# Patient Record
Sex: FEMALE | Race: WHITE | NOT HISPANIC OR LATINO | Employment: FULL TIME | RURAL
[De-identification: names, ages, dates, MRNs, and addresses within clinical notes are randomized per-mention and may not be internally consistent; named-entity substitution may affect disease eponyms.]

---

## 2019-10-28 ENCOUNTER — OFFICE VISIT (OUTPATIENT)
Dept: FAMILY MEDICINE CLINIC | Facility: CLINIC | Age: 34
End: 2019-10-28

## 2019-10-28 VITALS
TEMPERATURE: 98 F | DIASTOLIC BLOOD PRESSURE: 76 MMHG | WEIGHT: 166.2 LBS | RESPIRATION RATE: 18 BRPM | HEIGHT: 68 IN | HEART RATE: 62 BPM | OXYGEN SATURATION: 99 % | BODY MASS INDEX: 25.19 KG/M2 | SYSTOLIC BLOOD PRESSURE: 113 MMHG

## 2019-10-28 DIAGNOSIS — Z13.29 SCREENING FOR HYPOTHYROIDISM: ICD-10-CM

## 2019-10-28 DIAGNOSIS — K42.0 UMBILICAL HERNIA WITH OBSTRUCTION, WITHOUT GANGRENE: ICD-10-CM

## 2019-10-28 DIAGNOSIS — Z00.00 ANNUAL PHYSICAL EXAM: Primary | ICD-10-CM

## 2019-10-28 DIAGNOSIS — Z13.220 SCREENING FOR HYPERLIPIDEMIA: ICD-10-CM

## 2019-10-28 PROCEDURE — 99395 PREV VISIT EST AGE 18-39: CPT | Performed by: FAMILY MEDICINE

## 2019-10-28 PROCEDURE — 99213 OFFICE O/P EST LOW 20 MIN: CPT | Performed by: FAMILY MEDICINE

## 2019-10-28 RX ORDER — INFLUENZA VIRUS VACCINE 15; 15; 15; 15 UG/.5ML; UG/.5ML; UG/.5ML; UG/.5ML
SUSPENSION INTRAMUSCULAR
Refills: 0 | COMMUNITY
Start: 2019-10-04 | End: 2019-10-28

## 2019-10-28 NOTE — PROGRESS NOTES
"Chief Complaint   Patient presents with   • Annual Exam     doesn't want PAP. (hasn't had one done if 5 years)    • Hernia     umbilical     Subjective   Goldie Weber is a 33 y.o. female here for her annual physical with me. Goldie is here for coordination of medical care, to discuss health maintenance, disease prevention as well as to followup on medical problems. Patient has been followed by me since . Patient's last CPE was 2018. Activity level is moderate. Exercises 2 per week. Appetite is good. Feels well with one complaints. Energy level is good. Sleeps well. Patient is doing routine self skin exam monthly. Patient is doing routine self-breast exams is NOT.  Patient has breast implants. Patient reports her last pap smear was 5 years ago.     Hernia:   Patient reports she thinks she has an umbilical hernia. This has been present for 5 years, ever since having a . She reports it protrudes during activities that she tightens her abdominal muscles. She reports mild pain to the area- like a \"catching\" during her work outs. She has not had this looked at prior to today's appointment.       Allergies:  Allergies no known allergies    Social History:  Social History     Socioeconomic History   • Marital status:      Spouse name: Not on file   • Number of children: Not on file   • Years of education: Not on file   • Highest education level: Not on file   Tobacco Use   • Smoking status: Never Smoker   • Smokeless tobacco: Never Used       Family History:  No family history on file.    Past Medical History :  Active Ambulatory Problems     Diagnosis Date Noted   • Annual physical exam 10/28/2019   • Umbilical hernia with obstruction, without gangrene 10/29/2019     Resolved Ambulatory Problems     Diagnosis Date Noted   • No Resolved Ambulatory Problems     No Additional Past Medical History       Medication List:  No current outpatient medications on file.    Past Surgical History:  No " "past surgical history on file.    Review Of Systems:  Review of Systems   Constitutional: Negative for activity change, appetite change and fatigue.   HENT: Negative for congestion, postnasal drip, sinus pressure and sore throat.    Eyes: Negative for blurred vision and itching.   Respiratory: Negative for cough, shortness of breath and wheezing.    Cardiovascular: Negative for chest pain.   Gastrointestinal: Negative for abdominal pain, constipation, nausea, vomiting and GERD.        Possible umbilical hernia.    Endocrine: Negative for cold intolerance and heat intolerance.   Genitourinary: Negative for difficulty urinating, dysuria and urinary incontinence.   Musculoskeletal: Negative for back pain, joint swelling and neck pain.   Skin: Negative for color change and rash.   Neurological: Negative for dizziness, speech difficulty, weakness and memory problem.   Psychiatric/Behavioral: Negative for behavioral problems, decreased concentration, suicidal ideas and depressed mood.       The following portions of the patient's history were reviewed and updated as appropriate: allergies, current medications, past family history, past medical history, past social history, past surgical history and problem list.      Physical Exam:  Vital Signs:  Visit Vitals  /76   Pulse 62   Temp 98 °F (36.7 °C)   Resp 18   Ht 172.7 cm (68\")   Wt 75.4 kg (166 lb 3.2 oz)   SpO2 99%   BMI 25.27 kg/m²       Physical Exam   Constitutional: She is oriented to person, place, and time. She appears well-developed and well-nourished. She is active.   HENT:   Head: Normocephalic and atraumatic.   Nose: Nose normal.   Eyes: Conjunctivae and lids are normal. Pupils are equal, round, and reactive to light.   Neck: Normal range of motion. Neck supple.   Cardiovascular: Normal rate, regular rhythm, normal heart sounds and normal pulses.   Pulmonary/Chest: Effort normal and breath sounds normal. No respiratory distress.   Abdominal: Soft. Bowel " sounds are normal.   Musculoskeletal: Normal range of motion.   Neurological: She is alert and oriented to person, place, and time.   Skin: Skin is warm and dry. Capillary refill takes less than 2 seconds.   Psychiatric: She has a normal mood and affect. Her behavior is normal. Judgment and thought content normal.   Vitals reviewed.        Assessment and Plan:  Diagnoses and all orders for this visit:    1. Annual physical exam (Primary)  Assessment & Plan:  Discussed injury prevention, diet and exercise, safe sexual practices, and screening for common diseases. Encouraged use of sunscreen and seatbelts. Discussed timing of  cervical cancer screening. Encouraged monthly self-breast exams, yearly clinical breast exams, and discussed timing of mammograms. Avoidance of tobacco encouraged. Limitation or avoidance of alcohol encouraged. Recommend yearly dental and eye exams. Also discussed monitoring of blood pressure, lipids.            2. Umbilical hernia with obstruction, without gangrene  Assessment & Plan:  She was referred to Gen Surg for further evaluation and treatment.      Orders:  -     Ambulatory Referral to General Surgery    3. Screening for hyperlipidemia  -     Lipid Panel With / Chol / HDL Ratio  -     Comprehensive Metabolic Panel  -     CBC Auto Differential    4. Screening for hypothyroidism  -     TSH    Other orders  -     CBC & Differential

## 2019-10-29 PROBLEM — K42.0 UMBILICAL HERNIA WITH OBSTRUCTION, WITHOUT GANGRENE: Status: ACTIVE | Noted: 2019-10-29

## 2019-10-29 LAB
ALBUMIN SERPL-MCNC: 4.8 G/DL (ref 3.5–5.5)
ALBUMIN/GLOB SERPL: 1.8 {RATIO} (ref 1.2–2.2)
ALP SERPL-CCNC: 54 IU/L (ref 39–117)
ALT SERPL-CCNC: 11 IU/L (ref 0–32)
AST SERPL-CCNC: 18 IU/L (ref 0–40)
BASOPHILS # BLD AUTO: 0 X10E3/UL (ref 0–0.2)
BASOPHILS NFR BLD AUTO: 0 %
BILIRUB SERPL-MCNC: 0.5 MG/DL (ref 0–1.2)
BUN SERPL-MCNC: 16 MG/DL (ref 6–20)
BUN/CREAT SERPL: 18 (ref 9–23)
CALCIUM SERPL-MCNC: 9.6 MG/DL (ref 8.7–10.2)
CHLORIDE SERPL-SCNC: 102 MMOL/L (ref 96–106)
CHOLEST SERPL-MCNC: 232 MG/DL (ref 100–199)
CHOLEST/HDLC SERPL: 5.3 RATIO (ref 0–4.4)
CO2 SERPL-SCNC: 23 MMOL/L (ref 20–29)
CREAT SERPL-MCNC: 0.89 MG/DL (ref 0.57–1)
EOSINOPHIL # BLD AUTO: 0.2 X10E3/UL (ref 0–0.4)
EOSINOPHIL NFR BLD AUTO: 3 %
ERYTHROCYTE [DISTWIDTH] IN BLOOD BY AUTOMATED COUNT: 14 % (ref 12.3–15.4)
GLOBULIN SER CALC-MCNC: 2.6 G/DL (ref 1.5–4.5)
GLUCOSE SERPL-MCNC: 90 MG/DL (ref 65–99)
HCT VFR BLD AUTO: 42.7 % (ref 34–46.6)
HDLC SERPL-MCNC: 44 MG/DL
HGB BLD-MCNC: 13.9 G/DL (ref 11.1–15.9)
IMM GRANULOCYTES # BLD AUTO: 0 X10E3/UL (ref 0–0.1)
IMM GRANULOCYTES NFR BLD AUTO: 0 %
LDLC SERPL CALC-MCNC: 149 MG/DL (ref 0–99)
LYMPHOCYTES # BLD AUTO: 2.6 X10E3/UL (ref 0.7–3.1)
LYMPHOCYTES NFR BLD AUTO: 37 %
MCH RBC QN AUTO: 28.7 PG (ref 26.6–33)
MCHC RBC AUTO-ENTMCNC: 32.6 G/DL (ref 31.5–35.7)
MCV RBC AUTO: 88 FL (ref 79–97)
MONOCYTES # BLD AUTO: 0.4 X10E3/UL (ref 0.1–0.9)
MONOCYTES NFR BLD AUTO: 5 %
NEUTROPHILS # BLD AUTO: 3.8 X10E3/UL (ref 1.4–7)
NEUTROPHILS NFR BLD AUTO: 55 %
PLATELET # BLD AUTO: 299 X10E3/UL (ref 150–450)
POTASSIUM SERPL-SCNC: 4.2 MMOL/L (ref 3.5–5.2)
PROT SERPL-MCNC: 7.4 G/DL (ref 6–8.5)
RBC # BLD AUTO: 4.85 X10E6/UL (ref 3.77–5.28)
SODIUM SERPL-SCNC: 140 MMOL/L (ref 134–144)
TRIGL SERPL-MCNC: 193 MG/DL (ref 0–149)
TSH SERPL DL<=0.005 MIU/L-ACNC: 2.36 UIU/ML (ref 0.45–4.5)
VLDLC SERPL CALC-MCNC: 39 MG/DL (ref 5–40)
WBC # BLD AUTO: 7 X10E3/UL (ref 3.4–10.8)

## 2019-10-29 NOTE — ASSESSMENT & PLAN NOTE
Discussed injury prevention, diet and exercise, safe sexual practices, and screening for common diseases. Encouraged use of sunscreen and seatbelts. Discussed timing of  cervical cancer screening. Encouraged monthly self-breast exams, yearly clinical breast exams, and discussed timing of mammograms. Avoidance of tobacco encouraged. Limitation or avoidance of alcohol encouraged. Recommend yearly dental and eye exams. Also discussed monitoring of blood pressure, lipids.

## 2019-10-31 ENCOUNTER — OFFICE VISIT (OUTPATIENT)
Dept: SURGERY | Facility: CLINIC | Age: 34
End: 2019-10-31

## 2019-10-31 VITALS
BODY MASS INDEX: 26.71 KG/M2 | WEIGHT: 166.2 LBS | SYSTOLIC BLOOD PRESSURE: 120 MMHG | DIASTOLIC BLOOD PRESSURE: 83 MMHG | HEART RATE: 77 BPM | OXYGEN SATURATION: 99 % | TEMPERATURE: 98 F | HEIGHT: 66 IN

## 2019-10-31 DIAGNOSIS — K42.0 UMBILICAL HERNIA WITH OBSTRUCTION, WITHOUT GANGRENE: Primary | ICD-10-CM

## 2019-10-31 PROCEDURE — 99203 OFFICE O/P NEW LOW 30 MIN: CPT | Performed by: SURGERY

## 2019-10-31 NOTE — PROGRESS NOTES
"Subjective   Goldie Weber is a 33 y.o. female.   Chief Complaint   Patient presents with   • Hernia     umbilical     /83   Pulse 77   Temp 98 °F (36.7 °C) (Oral)   Ht 167.6 cm (66\")   Wt 75.4 kg (166 lb 3.2 oz)   SpO2 99%   BMI 26.83 kg/m²     HISTORY OF PRESENT ILLNESS:  33-year-old lady who is been referred to me for evaluation of an umbilical hernia.  She says it has likely been present for about 2 years.  She has had recent weight loss and has been doing strenuous activity including Olympic style lifting and she is noticed a small bulge in the lateral aspect of her bellybutton.  She says that she has some mild discomfort here it feels like catching and she has to push on it.  When she pushes it back in she has discomfort relief.  She says that this has to happen nearly daily.  She discussed it with her  who suggested that it get evaluated.  She denies any obstructive symptoms like nausea vomiting constipation or obstipation.      No outpatient encounter medications on file as of 10/31/2019.     No facility-administered encounter medications on file as of 10/31/2019.          The following portions of the patient's history were reviewed and updated as appropriate: allergies, current medications, past family history, past medical history, past social history, past surgical history and problem list.    Review of Systems  A complete review of system has been obtained is positive for loss of sleep abdominal pain irregular heartbeat and varicose veins tooth pain in the remainder of the review of systems is negative  Objective   Physical Exam   Constitutional: She appears well-developed and well-nourished.   HENT:   Head: Normocephalic and atraumatic.   Eyes: Conjunctivae are normal. No scleral icterus.   Neck: No tracheal deviation present.   Cardiovascular: Normal rate and intact distal pulses.   Pulmonary/Chest: Effort normal. No respiratory distress.   Abdominal: Soft.   Abdomen is soft " and flat no significant abdominal tenderness to palpation in the right superior lateral aspect there is a hernia sac that is likely about 1 cm and with reduction it feels like the hernia defect itself is likely more like 2 to 3 mm.  Minor discomfort during palpation no overlying skin changes like erythema   Musculoskeletal: She exhibits no edema or deformity.   Lymphadenopathy:     She has no cervical adenopathy.   Neurological: She is alert. No cranial nerve deficit.   Skin: Skin is warm and dry.   Psychiatric: She has a normal mood and affect. Her behavior is normal.         Assessment/Plan   Goldie was seen today for hernia.    Diagnoses and all orders for this visit:    Umbilical hernia with obstruction, without gangrene    Counseled her about the natural history of umbilical hernias and that watchful waiting would be an appropriate strategy but as this becomes more symptomatic it starts to make more sense to undergo repair.  I counseled her about the risks of the operation and the surgical approach which would be an open repair likely with sutures alone since this appears to be a less than 1 cm hernia defect.  She had some concern about mesh placement if it was a larger hernia repair.  We talked about the risks and benefits of mesh placement in the event that the hernia was larger than I can feel on exam.  After discussion she does want to move forward with the hernia repair.  I have advised her to not lift any more than 15 pounds for 4 weeks after surgery but that she should be able to get back to more cardio style within 1 to 2 weeks after surgery I have asked her not to do any dead lifts or squats for about 6 weeks following the repair to reduce the risk of recurrence.    Mendez Cavazos MD  10/31/2019  4:16 PM

## 2019-11-01 RX ORDER — SODIUM CHLORIDE 9 MG/ML
100 INJECTION, SOLUTION INTRAVENOUS CONTINUOUS
Status: CANCELLED | OUTPATIENT
Start: 2019-11-01

## 2019-11-06 ENCOUNTER — APPOINTMENT (OUTPATIENT)
Dept: PREADMISSION TESTING | Facility: HOSPITAL | Age: 34
End: 2019-11-06

## 2019-11-06 VITALS
OXYGEN SATURATION: 99 % | SYSTOLIC BLOOD PRESSURE: 115 MMHG | BODY MASS INDEX: 26.55 KG/M2 | HEART RATE: 71 BPM | WEIGHT: 164.5 LBS | DIASTOLIC BLOOD PRESSURE: 79 MMHG

## 2019-11-08 ENCOUNTER — ANESTHESIA EVENT (OUTPATIENT)
Dept: PERIOP | Facility: HOSPITAL | Age: 34
End: 2019-11-08

## 2019-11-11 ENCOUNTER — HOSPITAL ENCOUNTER (OUTPATIENT)
Facility: HOSPITAL | Age: 34
Setting detail: HOSPITAL OUTPATIENT SURGERY
Discharge: HOME | End: 2019-11-11
Attending: SURGERY | Admitting: SURGERY

## 2019-11-11 ENCOUNTER — ANESTHESIA (OUTPATIENT)
Dept: PERIOP | Facility: HOSPITAL | Age: 34
End: 2019-11-11

## 2019-11-11 VITALS
TEMPERATURE: 98.6 F | DIASTOLIC BLOOD PRESSURE: 74 MMHG | HEART RATE: 62 BPM | SYSTOLIC BLOOD PRESSURE: 110 MMHG | OXYGEN SATURATION: 99 % | RESPIRATION RATE: 16 BRPM

## 2019-11-11 DIAGNOSIS — K42.0 UMBILICAL HERNIA WITH OBSTRUCTION, WITHOUT GANGRENE: ICD-10-CM

## 2019-11-11 PROBLEM — K42.9 UMBILICAL HERNIA WITHOUT OBSTRUCTION AND WITHOUT GANGRENE: Status: ACTIVE | Noted: 2019-10-29

## 2019-11-11 LAB — B-HCG UR QL: NEGATIVE

## 2019-11-11 PROCEDURE — 25010000002 DEXAMETHASONE PER 1 MG: Performed by: NURSE ANESTHETIST, CERTIFIED REGISTERED

## 2019-11-11 PROCEDURE — 25010000002 ONDANSETRON PER 1 MG: Performed by: ANESTHESIOLOGY

## 2019-11-11 PROCEDURE — 49585 PR REPAIR UMBILICAL HERN,5+Y/O,REDUC: CPT | Performed by: NURSE PRACTITIONER

## 2019-11-11 PROCEDURE — 49585 PR REPAIR UMBILICAL HERN,5+Y/O,REDUC: CPT | Performed by: SURGERY

## 2019-11-11 PROCEDURE — 81025 URINE PREGNANCY TEST: CPT | Performed by: SURGERY

## 2019-11-11 PROCEDURE — 25010000002 KETOROLAC TROMETHAMINE PER 15 MG: Performed by: NURSE ANESTHETIST, CERTIFIED REGISTERED

## 2019-11-11 PROCEDURE — 25010000002 PROPOFOL 10 MG/ML EMULSION: Performed by: NURSE ANESTHETIST, CERTIFIED REGISTERED

## 2019-11-11 PROCEDURE — 25010000002 ONDANSETRON PER 1 MG: Performed by: NURSE ANESTHETIST, CERTIFIED REGISTERED

## 2019-11-11 PROCEDURE — 25010000002 FENTANYL CITRATE (PF) 100 MCG/2ML SOLUTION: Performed by: NURSE ANESTHETIST, CERTIFIED REGISTERED

## 2019-11-11 RX ORDER — PROMETHAZINE HYDROCHLORIDE 25 MG/1
25 TABLET ORAL ONCE AS NEEDED
Status: DISCONTINUED | OUTPATIENT
Start: 2019-11-11 | End: 2019-11-11 | Stop reason: HOSPADM

## 2019-11-11 RX ORDER — FENTANYL CITRATE 50 UG/ML
INJECTION, SOLUTION INTRAMUSCULAR; INTRAVENOUS AS NEEDED
Status: DISCONTINUED | OUTPATIENT
Start: 2019-11-11 | End: 2019-11-11 | Stop reason: SURG

## 2019-11-11 RX ORDER — MEPERIDINE HYDROCHLORIDE 25 MG/ML
12.5 INJECTION INTRAMUSCULAR; INTRAVENOUS; SUBCUTANEOUS
Status: DISCONTINUED | OUTPATIENT
Start: 2019-11-11 | End: 2019-11-11 | Stop reason: HOSPADM

## 2019-11-11 RX ORDER — SODIUM CHLORIDE 9 MG/ML
9 INJECTION, SOLUTION INTRAVENOUS CONTINUOUS PRN
Status: DISCONTINUED | OUTPATIENT
Start: 2019-11-11 | End: 2019-11-11 | Stop reason: HOSPADM

## 2019-11-11 RX ORDER — KETOROLAC TROMETHAMINE 30 MG/ML
INJECTION, SOLUTION INTRAMUSCULAR; INTRAVENOUS AS NEEDED
Status: DISCONTINUED | OUTPATIENT
Start: 2019-11-11 | End: 2019-11-11 | Stop reason: SURG

## 2019-11-11 RX ORDER — SODIUM CHLORIDE 0.9 % (FLUSH) 0.9 %
3 SYRINGE (ML) INJECTION EVERY 12 HOURS SCHEDULED
Status: DISCONTINUED | OUTPATIENT
Start: 2019-11-11 | End: 2019-11-11 | Stop reason: HOSPADM

## 2019-11-11 RX ORDER — LIDOCAINE HYDROCHLORIDE 20 MG/ML
INJECTION, SOLUTION EPIDURAL; INFILTRATION; INTRACAUDAL; PERINEURAL AS NEEDED
Status: DISCONTINUED | OUTPATIENT
Start: 2019-11-11 | End: 2019-11-11 | Stop reason: SURG

## 2019-11-11 RX ORDER — LIDOCAINE HYDROCHLORIDE AND EPINEPHRINE 10; 10 MG/ML; UG/ML
INJECTION, SOLUTION INFILTRATION; PERINEURAL AS NEEDED
Status: DISCONTINUED | OUTPATIENT
Start: 2019-11-11 | End: 2019-11-11 | Stop reason: HOSPADM

## 2019-11-11 RX ORDER — HYDROCODONE BITARTRATE AND ACETAMINOPHEN 5; 325 MG/1; MG/1
1 TABLET ORAL ONCE AS NEEDED
Status: DISCONTINUED | OUTPATIENT
Start: 2019-11-11 | End: 2019-11-11 | Stop reason: HOSPADM

## 2019-11-11 RX ORDER — SODIUM CHLORIDE 9 MG/ML
100 INJECTION, SOLUTION INTRAVENOUS CONTINUOUS
Status: DISCONTINUED | OUTPATIENT
Start: 2019-11-11 | End: 2019-11-11 | Stop reason: HOSPADM

## 2019-11-11 RX ORDER — PROMETHAZINE HYDROCHLORIDE 25 MG/1
25 SUPPOSITORY RECTAL ONCE AS NEEDED
Status: DISCONTINUED | OUTPATIENT
Start: 2019-11-11 | End: 2019-11-11 | Stop reason: HOSPADM

## 2019-11-11 RX ORDER — DIPHENHYDRAMINE HYDROCHLORIDE 50 MG/ML
12.5 INJECTION INTRAMUSCULAR; INTRAVENOUS
Status: DISCONTINUED | OUTPATIENT
Start: 2019-11-11 | End: 2019-11-11 | Stop reason: HOSPADM

## 2019-11-11 RX ORDER — ONDANSETRON 2 MG/ML
4 INJECTION INTRAMUSCULAR; INTRAVENOUS ONCE AS NEEDED
Status: DISCONTINUED | OUTPATIENT
Start: 2019-11-11 | End: 2019-11-11 | Stop reason: HOSPADM

## 2019-11-11 RX ORDER — ONDANSETRON 2 MG/ML
INJECTION INTRAMUSCULAR; INTRAVENOUS AS NEEDED
Status: DISCONTINUED | OUTPATIENT
Start: 2019-11-11 | End: 2019-11-11 | Stop reason: SURG

## 2019-11-11 RX ORDER — DEXAMETHASONE SODIUM PHOSPHATE 4 MG/ML
INJECTION, SOLUTION INTRA-ARTICULAR; INTRALESIONAL; INTRAMUSCULAR; INTRAVENOUS; SOFT TISSUE AS NEEDED
Status: DISCONTINUED | OUTPATIENT
Start: 2019-11-11 | End: 2019-11-11 | Stop reason: SURG

## 2019-11-11 RX ORDER — PROPOFOL 10 MG/ML
VIAL (ML) INTRAVENOUS AS NEEDED
Status: DISCONTINUED | OUTPATIENT
Start: 2019-11-11 | End: 2019-11-11 | Stop reason: SURG

## 2019-11-11 RX ORDER — PROMETHAZINE HYDROCHLORIDE 25 MG/ML
6.25 INJECTION, SOLUTION INTRAMUSCULAR; INTRAVENOUS ONCE AS NEEDED
Status: DISCONTINUED | OUTPATIENT
Start: 2019-11-11 | End: 2019-11-11 | Stop reason: HOSPADM

## 2019-11-11 RX ORDER — SCOLOPAMINE TRANSDERMAL SYSTEM 1 MG/1
1 PATCH, EXTENDED RELEASE TRANSDERMAL
Status: DISCONTINUED | OUTPATIENT
Start: 2019-11-11 | End: 2019-11-11 | Stop reason: HOSPADM

## 2019-11-11 RX ORDER — SODIUM CHLORIDE 0.9 % (FLUSH) 0.9 %
3-10 SYRINGE (ML) INJECTION AS NEEDED
Status: DISCONTINUED | OUTPATIENT
Start: 2019-11-11 | End: 2019-11-11 | Stop reason: HOSPADM

## 2019-11-11 RX ORDER — FENTANYL CITRATE 50 UG/ML
50 INJECTION, SOLUTION INTRAMUSCULAR; INTRAVENOUS
Status: DISCONTINUED | OUTPATIENT
Start: 2019-11-11 | End: 2019-11-11 | Stop reason: HOSPADM

## 2019-11-11 RX ORDER — ONDANSETRON 2 MG/ML
4 INJECTION INTRAMUSCULAR; INTRAVENOUS ONCE
Status: COMPLETED | OUTPATIENT
Start: 2019-11-11 | End: 2019-11-11

## 2019-11-11 RX ORDER — HYDROCODONE BITARTRATE AND ACETAMINOPHEN 5; 325 MG/1; MG/1
1 TABLET ORAL EVERY 4 HOURS PRN
Qty: 10 TABLET | Refills: 0 | Status: SHIPPED | OUTPATIENT
Start: 2019-11-11 | End: 2021-12-15 | Stop reason: HOSPADM

## 2019-11-11 RX ADMIN — CEFAZOLIN SODIUM 2 G: 1 INJECTION, POWDER, FOR SOLUTION INTRAMUSCULAR; INTRAVENOUS at 10:51

## 2019-11-11 RX ADMIN — FENTANYL CITRATE 100 MCG: 50 INJECTION, SOLUTION INTRAMUSCULAR; INTRAVENOUS at 10:46

## 2019-11-11 RX ADMIN — FENTANYL CITRATE 50 MCG: 50 INJECTION, SOLUTION INTRAMUSCULAR; INTRAVENOUS at 11:01

## 2019-11-11 RX ADMIN — ONDANSETRON 4 MG: 2 INJECTION INTRAMUSCULAR; INTRAVENOUS at 11:00

## 2019-11-11 RX ADMIN — SCOPALAMINE 1 PATCH: 1 PATCH, EXTENDED RELEASE TRANSDERMAL at 10:30

## 2019-11-11 RX ADMIN — SODIUM CHLORIDE 100 ML/HR: 900 INJECTION, SOLUTION INTRAVENOUS at 10:30

## 2019-11-11 RX ADMIN — ONDANSETRON 4 MG: 2 INJECTION INTRAMUSCULAR; INTRAVENOUS at 10:30

## 2019-11-11 RX ADMIN — DEXAMETHASONE SODIUM PHOSPHATE 4 MG: 4 INJECTION, SOLUTION INTRAMUSCULAR; INTRAVENOUS at 10:51

## 2019-11-11 RX ADMIN — LIDOCAINE HYDROCHLORIDE 100 MG: 20 INJECTION, SOLUTION EPIDURAL; INFILTRATION; INTRACAUDAL; PERINEURAL at 10:46

## 2019-11-11 RX ADMIN — KETOROLAC TROMETHAMINE 30 MG: 30 INJECTION, SOLUTION INTRAMUSCULAR at 11:00

## 2019-11-11 RX ADMIN — PROPOFOL 200 MG: 10 INJECTION, EMULSION INTRAVENOUS at 10:46

## 2019-11-11 RX ADMIN — SODIUM CHLORIDE: 0.9 INJECTION, SOLUTION INTRAVENOUS at 10:51

## 2019-11-11 NOTE — ANESTHESIA PROCEDURE NOTES
Airway  Urgency: elective    Date/Time: 11/11/2019 10:49 AM  Difficult airway    General Information and Staff    Patient location during procedure: OR    Indications and Patient Condition  Indications for airway management: airway protection    Preoxygenated: yes  MILS maintained throughout  Mask difficulty assessment: 0 - not attempted    Final Airway Details  Final airway type: supraglottic airway      Successful airway: unique  Size 4    Number of attempts at approach: 1  Assessment: lips, teeth, and gum same as pre-op

## 2019-11-11 NOTE — OP NOTE
Operative Report:    Patient Name:  Goldie Weber  YOB: 1985    Date of Surgery:  11/11/2019     Indications: 34-year-old lady who presents to the office to discuss a symptomatic umbilical hernia that has been present for about 2 years.  I counseled her about the diagnosis and the treatment options including watchful waiting versus moving forward with surgical repair as well as the expected recovery.  After our discussion she wished to have her hernia surgically fixed.  I counseled her about the risk of mesh placement and the indications for mesh and that if this was a 2 to 3 mm hernia I likely would perform a suture repair alone.  She understood was willing to proceed.    Pre-op Diagnosis:   Umbilical hernia without obstruction, without gangrene     Post-Op Diagnosis Codes:     * Umbilical hernia without obstruction, without gangrene    Procedure/CPT® Codes:      Procedure(s):  UMBILICAL HERNIA REPAIR    Staff:  Surgeon(s):  Mendez Cavazos MD    Assistant: Leyla Phillip APRN    Anesthesia: General    Estimated Blood Loss: minimal    Implants:    Nothing was implanted during the procedure    Specimen:          None        Findings: Approximately 3 mm umbilical hernia defect containing preperitoneal fat    Complications: None    Description of Procedure: Patient was taken to the operating room placed on the operating table in the supine position under general anesthesia.  Her abdomen was prepped and draped in normal sterile fashion.  Timeout was performed identifying the correct patient procedure site of operation.  I began the operation by making a curvilinear infra umbilical skin incision with a 15 blade scalpel.  Dissection down to the abdominal fascia and around the umbilical stalk was performed with the Bovie.  The umbilical stalk was then dissected free off of the fascia which revealed an approximately 3 mm hernia defect that had preperitoneal fat bulging through this defect.  The  fascial edges were cleared and the hernia defect was closed with interrupted 0 Ethibond sutures x3.  The umbilical stalk was then tacked to the fascia using a 3-0 Vicryl suture.  The dermis was closed with 3-0 Vicryl suture skin closed with 4-0 Vicryl suture with skin affix placed on the wound.      Mendez Cavazos MD     Date: 11/11/2019  Time: 11:17 AM

## 2021-12-15 ENCOUNTER — HOSPITAL ENCOUNTER (OUTPATIENT)
Facility: HOSPITAL | Age: 36
Setting detail: OBSERVATION
Discharge: HOME | End: 2021-12-15
Attending: EMERGENCY MEDICINE | Admitting: EMERGENCY MEDICINE

## 2021-12-15 ENCOUNTER — APPOINTMENT (OUTPATIENT)
Dept: CT IMAGING | Facility: HOSPITAL | Age: 36
End: 2021-12-15

## 2021-12-15 ENCOUNTER — APPOINTMENT (OUTPATIENT)
Dept: NUCLEAR MEDICINE | Facility: HOSPITAL | Age: 36
End: 2021-12-15

## 2021-12-15 ENCOUNTER — APPOINTMENT (OUTPATIENT)
Dept: GENERAL RADIOLOGY | Facility: HOSPITAL | Age: 36
End: 2021-12-15

## 2021-12-15 VITALS
TEMPERATURE: 98.2 F | OXYGEN SATURATION: 97 % | HEART RATE: 75 BPM | RESPIRATION RATE: 16 BRPM | WEIGHT: 171.3 LBS | SYSTOLIC BLOOD PRESSURE: 108 MMHG | BODY MASS INDEX: 26.89 KG/M2 | DIASTOLIC BLOOD PRESSURE: 74 MMHG | HEIGHT: 67 IN

## 2021-12-15 DIAGNOSIS — R07.9 CHEST PAIN, UNSPECIFIED TYPE: Primary | ICD-10-CM

## 2021-12-15 LAB
ALBUMIN SERPL-MCNC: 4 G/DL (ref 3.5–5.2)
ALBUMIN SERPL-MCNC: 4.3 G/DL (ref 3.5–5.2)
ALBUMIN/GLOB SERPL: 1.2 G/DL
ALBUMIN/GLOB SERPL: 1.3 G/DL
ALP SERPL-CCNC: 54 U/L (ref 39–117)
ALP SERPL-CCNC: 56 U/L (ref 39–117)
ALT SERPL W P-5'-P-CCNC: 10 U/L (ref 1–33)
ALT SERPL W P-5'-P-CCNC: 8 U/L (ref 1–33)
ANION GAP SERPL CALCULATED.3IONS-SCNC: 10 MMOL/L (ref 5–15)
ANION GAP SERPL CALCULATED.3IONS-SCNC: 13 MMOL/L (ref 5–15)
APTT PPP: 26.2 SECONDS (ref 24–31)
AST SERPL-CCNC: 13 U/L (ref 1–32)
AST SERPL-CCNC: 15 U/L (ref 1–32)
BASOPHILS # BLD AUTO: 0.1 10*3/MM3 (ref 0–0.2)
BASOPHILS NFR BLD AUTO: 0.8 % (ref 0–1.5)
BH CV REST NUCLEAR ISOTOPE DOSE: 7.9 MCI
BH CV STRESS BP STAGE 1: NORMAL
BH CV STRESS BP STAGE 2: NORMAL
BH CV STRESS DURATION MIN STAGE 1: 3
BH CV STRESS DURATION MIN STAGE 2: 3
BH CV STRESS DURATION MIN STAGE 3: 0
BH CV STRESS DURATION SEC STAGE 1: 0
BH CV STRESS DURATION SEC STAGE 2: 0
BH CV STRESS DURATION SEC STAGE 3: 46
BH CV STRESS GRADE STAGE 1: 10
BH CV STRESS GRADE STAGE 2: 12
BH CV STRESS GRADE STAGE 3: 14
BH CV STRESS HR STAGE 1: 103
BH CV STRESS HR STAGE 2: 121
BH CV STRESS HR STAGE 3: 130
BH CV STRESS METS STAGE 1: 5
BH CV STRESS METS STAGE 2: 7.5
BH CV STRESS METS STAGE 3: 10
BH CV STRESS NUCLEAR ISOTOPE DOSE: 21.9 MCI
BH CV STRESS PROTOCOL 1: NORMAL
BH CV STRESS RECOVERY BP: NORMAL MMHG
BH CV STRESS RECOVERY HR: 84 BPM
BH CV STRESS SPEED STAGE 1: 1.7
BH CV STRESS SPEED STAGE 2: 2.5
BH CV STRESS SPEED STAGE 3: 3.4
BH CV STRESS STAGE 1: 1
BH CV STRESS STAGE 2: 2
BH CV STRESS STAGE 3: 3
BILIRUB SERPL-MCNC: 0.4 MG/DL (ref 0–1.2)
BILIRUB SERPL-MCNC: 0.4 MG/DL (ref 0–1.2)
BUN SERPL-MCNC: 12 MG/DL (ref 6–20)
BUN SERPL-MCNC: 12 MG/DL (ref 6–20)
BUN/CREAT SERPL: 13.5 (ref 7–25)
BUN/CREAT SERPL: 14.8 (ref 7–25)
CALCIUM SPEC-SCNC: 8.8 MG/DL (ref 8.6–10.5)
CALCIUM SPEC-SCNC: 9.1 MG/DL (ref 8.6–10.5)
CHLORIDE SERPL-SCNC: 103 MMOL/L (ref 98–107)
CHLORIDE SERPL-SCNC: 104 MMOL/L (ref 98–107)
CO2 SERPL-SCNC: 21 MMOL/L (ref 22–29)
CO2 SERPL-SCNC: 23 MMOL/L (ref 22–29)
CREAT SERPL-MCNC: 0.81 MG/DL (ref 0.57–1)
CREAT SERPL-MCNC: 0.89 MG/DL (ref 0.57–1)
D DIMER PPP FEU-MCNC: 1.63 MG/L (FEU) (ref 0–0.59)
DEPRECATED RDW RBC AUTO: 38.9 FL (ref 37–54)
EOSINOPHIL # BLD AUTO: 0.2 10*3/MM3 (ref 0–0.4)
EOSINOPHIL NFR BLD AUTO: 2.4 % (ref 0.3–6.2)
ERYTHROCYTE [DISTWIDTH] IN BLOOD BY AUTOMATED COUNT: 13.2 % (ref 12.3–15.4)
GFR SERPL CREATININE-BSD FRML MDRD: 71 ML/MIN/1.73
GFR SERPL CREATININE-BSD FRML MDRD: 80 ML/MIN/1.73
GLOBULIN UR ELPH-MCNC: 3.3 GM/DL
GLOBULIN UR ELPH-MCNC: 3.4 GM/DL
GLUCOSE SERPL-MCNC: 107 MG/DL (ref 65–99)
GLUCOSE SERPL-MCNC: 97 MG/DL (ref 65–99)
HCG SERPL QL: NEGATIVE
HCT VFR BLD AUTO: 41.4 % (ref 34–46.6)
HGB BLD-MCNC: 14.1 G/DL (ref 12–15.9)
INR PPP: 1.03 (ref 0.93–1.1)
LV EF NUC BP: 58 %
LYMPHOCYTES # BLD AUTO: 3.5 10*3/MM3 (ref 0.7–3.1)
LYMPHOCYTES NFR BLD AUTO: 35.3 % (ref 19.6–45.3)
MAGNESIUM SERPL-MCNC: 2 MG/DL (ref 1.6–2.6)
MAXIMAL PREDICTED HEART RATE: 183 BPM
MCH RBC QN AUTO: 28.8 PG (ref 26.6–33)
MCHC RBC AUTO-ENTMCNC: 34 G/DL (ref 31.5–35.7)
MCV RBC AUTO: 84.9 FL (ref 79–97)
MONOCYTES # BLD AUTO: 0.7 10*3/MM3 (ref 0.1–0.9)
MONOCYTES NFR BLD AUTO: 6.9 % (ref 5–12)
NEUTROPHILS NFR BLD AUTO: 5.5 10*3/MM3 (ref 1.7–7)
NEUTROPHILS NFR BLD AUTO: 54.6 % (ref 42.7–76)
NRBC BLD AUTO-RTO: 0 /100 WBC (ref 0–0.2)
PERCENT MAX PREDICTED HR: 71.04 %
PLATELET # BLD AUTO: 338 10*3/MM3 (ref 140–450)
PMV BLD AUTO: 7.5 FL (ref 6–12)
POTASSIUM SERPL-SCNC: 4 MMOL/L (ref 3.5–5.2)
POTASSIUM SERPL-SCNC: 4.4 MMOL/L (ref 3.5–5.2)
PROT SERPL-MCNC: 7.4 G/DL (ref 6–8.5)
PROT SERPL-MCNC: 7.6 G/DL (ref 6–8.5)
PROTHROMBIN TIME: 11.4 SECONDS (ref 9.6–11.7)
RBC # BLD AUTO: 4.87 10*6/MM3 (ref 3.77–5.28)
SARS-COV-2 RNA PNL SPEC NAA+PROBE: NOT DETECTED
SODIUM SERPL-SCNC: 137 MMOL/L (ref 136–145)
SODIUM SERPL-SCNC: 137 MMOL/L (ref 136–145)
STRESS BASELINE BP: NORMAL MMHG
STRESS BASELINE HR: 90 BPM
STRESS PERCENT HR: 84 %
STRESS POST EXERCISE DUR MIN: 6 MIN
STRESS POST EXERCISE DUR SEC: 46 SEC
STRESS POST PEAK BP: NORMAL MMHG
STRESS POST PEAK HR: 130 BPM
STRESS TARGET HR: 156 BPM
TROPONIN T SERPL-MCNC: <0.01 NG/ML (ref 0–0.03)
TROPONIN T SERPL-MCNC: <0.01 NG/ML (ref 0–0.03)
WBC NRBC COR # BLD: 10 10*3/MM3 (ref 3.4–10.8)
WHOLE BLOOD HOLD SPECIMEN: NORMAL

## 2021-12-15 PROCEDURE — 0 IOPAMIDOL PER 1 ML: Performed by: EMERGENCY MEDICINE

## 2021-12-15 PROCEDURE — 96374 THER/PROPH/DIAG INJ IV PUSH: CPT

## 2021-12-15 PROCEDURE — 93016 CV STRESS TEST SUPVJ ONLY: CPT | Performed by: INTERNAL MEDICINE

## 2021-12-15 PROCEDURE — 84703 CHORIONIC GONADOTROPIN ASSAY: CPT | Performed by: EMERGENCY MEDICINE

## 2021-12-15 PROCEDURE — G0378 HOSPITAL OBSERVATION PER HR: HCPCS

## 2021-12-15 PROCEDURE — 93017 CV STRESS TEST TRACING ONLY: CPT

## 2021-12-15 PROCEDURE — 85025 COMPLETE CBC W/AUTO DIFF WBC: CPT | Performed by: EMERGENCY MEDICINE

## 2021-12-15 PROCEDURE — 87635 SARS-COV-2 COVID-19 AMP PRB: CPT | Performed by: EMERGENCY MEDICINE

## 2021-12-15 PROCEDURE — 93005 ELECTROCARDIOGRAM TRACING: CPT

## 2021-12-15 PROCEDURE — 96375 TX/PRO/DX INJ NEW DRUG ADDON: CPT

## 2021-12-15 PROCEDURE — 93018 CV STRESS TEST I&R ONLY: CPT | Performed by: INTERNAL MEDICINE

## 2021-12-15 PROCEDURE — A9502 TC99M TETROFOSMIN: HCPCS | Performed by: EMERGENCY MEDICINE

## 2021-12-15 PROCEDURE — 78452 HT MUSCLE IMAGE SPECT MULT: CPT

## 2021-12-15 PROCEDURE — 0 TECHNETIUM SESTAMIBI: Performed by: EMERGENCY MEDICINE

## 2021-12-15 PROCEDURE — 36415 COLL VENOUS BLD VENIPUNCTURE: CPT | Performed by: EMERGENCY MEDICINE

## 2021-12-15 PROCEDURE — 25010000002 MORPHINE PER 10 MG: Performed by: PHYSICIAN ASSISTANT

## 2021-12-15 PROCEDURE — 93005 ELECTROCARDIOGRAM TRACING: CPT | Performed by: EMERGENCY MEDICINE

## 2021-12-15 PROCEDURE — A9500 TC99M SESTAMIBI: HCPCS | Performed by: EMERGENCY MEDICINE

## 2021-12-15 PROCEDURE — 85610 PROTHROMBIN TIME: CPT | Performed by: EMERGENCY MEDICINE

## 2021-12-15 PROCEDURE — 25010000002 KETOROLAC TROMETHAMINE PER 15 MG: Performed by: EMERGENCY MEDICINE

## 2021-12-15 PROCEDURE — 84484 ASSAY OF TROPONIN QUANT: CPT | Performed by: EMERGENCY MEDICINE

## 2021-12-15 PROCEDURE — 80053 COMPREHEN METABOLIC PANEL: CPT | Performed by: EMERGENCY MEDICINE

## 2021-12-15 PROCEDURE — 71275 CT ANGIOGRAPHY CHEST: CPT

## 2021-12-15 PROCEDURE — 71045 X-RAY EXAM CHEST 1 VIEW: CPT

## 2021-12-15 PROCEDURE — 85379 FIBRIN DEGRADATION QUANT: CPT | Performed by: EMERGENCY MEDICINE

## 2021-12-15 PROCEDURE — 78452 HT MUSCLE IMAGE SPECT MULT: CPT | Performed by: INTERNAL MEDICINE

## 2021-12-15 PROCEDURE — C9803 HOPD COVID-19 SPEC COLLECT: HCPCS

## 2021-12-15 PROCEDURE — 99284 EMERGENCY DEPT VISIT MOD MDM: CPT

## 2021-12-15 PROCEDURE — 85730 THROMBOPLASTIN TIME PARTIAL: CPT | Performed by: EMERGENCY MEDICINE

## 2021-12-15 PROCEDURE — 0 TECHNETIUM TETROFOSMIN KIT: Performed by: EMERGENCY MEDICINE

## 2021-12-15 PROCEDURE — 83735 ASSAY OF MAGNESIUM: CPT | Performed by: EMERGENCY MEDICINE

## 2021-12-15 RX ORDER — HYDROCODONE BITARTRATE AND ACETAMINOPHEN 5; 325 MG/1; MG/1
1 TABLET ORAL EVERY 6 HOURS PRN
Qty: 12 TABLET | Refills: 0 | Status: SHIPPED | OUTPATIENT
Start: 2021-12-15 | End: 2021-12-18

## 2021-12-15 RX ORDER — ASPIRIN 81 MG/1
324 TABLET, CHEWABLE ORAL ONCE
Status: COMPLETED | OUTPATIENT
Start: 2021-12-15 | End: 2021-12-15

## 2021-12-15 RX ORDER — MORPHINE SULFATE 2 MG/ML
2 INJECTION, SOLUTION INTRAMUSCULAR; INTRAVENOUS EVERY 4 HOURS PRN
Status: DISCONTINUED | OUTPATIENT
Start: 2021-12-15 | End: 2021-12-15 | Stop reason: HOSPADM

## 2021-12-15 RX ORDER — CYCLOBENZAPRINE HCL 10 MG
10 TABLET ORAL 3 TIMES DAILY
Qty: 15 TABLET | Refills: 0 | Status: SHIPPED | OUTPATIENT
Start: 2021-12-15 | End: 2021-12-20

## 2021-12-15 RX ORDER — KETOROLAC TROMETHAMINE 30 MG/ML
30 INJECTION, SOLUTION INTRAMUSCULAR; INTRAVENOUS ONCE
Status: COMPLETED | OUTPATIENT
Start: 2021-12-15 | End: 2021-12-15

## 2021-12-15 RX ORDER — CYCLOBENZAPRINE HCL 10 MG
10 TABLET ORAL 3 TIMES DAILY
Status: DISCONTINUED | OUTPATIENT
Start: 2021-12-15 | End: 2021-12-15 | Stop reason: HOSPADM

## 2021-12-15 RX ADMIN — KETOROLAC TROMETHAMINE 30 MG: 30 INJECTION, SOLUTION INTRAMUSCULAR at 03:00

## 2021-12-15 RX ADMIN — ASPIRIN 81 MG CHEWABLE TABLET 324 MG: 81 TABLET CHEWABLE at 00:44

## 2021-12-15 RX ADMIN — MORPHINE SULFATE 2 MG: 2 INJECTION, SOLUTION INTRAMUSCULAR; INTRAVENOUS at 07:07

## 2021-12-15 RX ADMIN — IOPAMIDOL 82 ML: 755 INJECTION, SOLUTION INTRAVENOUS at 01:47

## 2021-12-15 RX ADMIN — TETROFOSMIN 1 DOSE: 1.38 INJECTION, POWDER, LYOPHILIZED, FOR SOLUTION INTRAVENOUS at 09:54

## 2021-12-15 RX ADMIN — CYCLOBENZAPRINE 10 MG: 10 TABLET, FILM COATED ORAL at 12:14

## 2021-12-15 RX ADMIN — TETROFOSMIN 1 DOSE: 1.38 INJECTION, POWDER, LYOPHILIZED, FOR SOLUTION INTRAVENOUS at 07:28

## 2021-12-15 NOTE — ED PROVIDER NOTES
Subjective   36-year-old female with onset of moderate substernal chest pain she describes as a spasm sensation at 11:30 PM.  Patient denies any associated symptoms.  Patient did have jaw surgery done to realign her jaw last Thursday.  No history of DVT or PE, calf pain or swelling.  No cardiac history.  Patient has had hyperlipidemia in the past.  Patient quit smoking 1 year ago.  No history of diabetes or hypertension or family history of premature CAD.  No clear aggravating alleviating factors regarding chest pain.          Review of Systems   HENT:        Postoperative jaw pain   Cardiovascular: Positive for chest pain.   All other systems reviewed and are negative.      Past Medical History:   Diagnosis Date   • Anxiety    • Varicose vein of leg        No Known Allergies    Past Surgical History:   Procedure Laterality Date   • BREAST AUGMENTATION     •  SECTION      x 2   • TONSILLECTOMY AND ADENOIDECTOMY     • UMBILICAL HERNIA REPAIR N/A 2019    Procedure: UMBILICAL HERNIA REPAIR;  Surgeon: Mendez Cavazos MD;  Location: [facility] OR;  Service: General       Family History   Problem Relation Age of Onset   • Diabetes Mother    • Arthritis Mother    • Diabetes Father    • Hypertension Father        Social History     Socioeconomic History   • Marital status:    Tobacco Use   • Smoking status: Former Smoker   • Smokeless tobacco: Never Used   Substance and Sexual Activity   • Alcohol use: Yes     Alcohol/week: 4.0 standard drinks     Types: 4 Glasses of wine per week   • Drug use: No   • Sexual activity: Defer           Objective   Physical Exam  Constitutional:       Appearance: She is well-developed.   HENT:      Mouth/Throat:      Mouth: Mucous membranes are moist.      Pharynx: Oropharynx is clear.   Eyes:      Conjunctiva/sclera: Conjunctivae normal.      Pupils: Pupils are equal, round, and reactive to light.   Cardiovascular:      Rate and Rhythm: Normal rate and regular rhythm.       Heart sounds: Normal heart sounds.   Pulmonary:      Effort: Pulmonary effort is normal.      Breath sounds: Normal breath sounds.   Abdominal:      General: Bowel sounds are normal. There is no distension.      Palpations: Abdomen is soft.      Tenderness: There is no abdominal tenderness.   Musculoskeletal:         General: No swelling or tenderness. Normal range of motion.   Skin:     General: Skin is warm and dry.      Capillary Refill: Capillary refill takes less than 2 seconds.   Neurological:      General: No focal deficit present.      Mental Status: She is alert and oriented to person, place, and time.         Procedures           ED Course  ED Course as of 12/15/21 0400   Wed Dec 15, 2021   0019 EKG interpretation: Normal sinus rhythm nonspecific T wave inversion, no stemi, rate 90 [JR]      ED Course User Index  [JR] Bertrand Szymanski MD                                                 [facility]  Number of Diagnoses or Management Options  Chest pain, unspecified type  Diagnosis management comments: Results for orders placed or performed during the hospital encounter of 12/15/21  -COVID-19,CEPHEID/ADAM,COR/RENE/PAD/ARINA IN-HOUSE(OR EMERGENT/ADD-ON),NP SWAB IN TRANSPORT MEDIA 3-4 HR TAT, RT-PCR - Swab, Nasopharynx:   Specimen: Nasopharynx; Swab       Result                      Value             Ref Range           COVID19                     Not Detected      Not Detected*  -Magnesium:   Specimen: Blood       Result                      Value             Ref Range           Magnesium                   2.0               1.6 - 2.6 mg*  -D-dimer, Quantitative:   Specimen: Blood       Result                      Value             Ref Range           D-Dimer, Quantitative       1.63 (H)          0.00 - 0.59 *  -hCG, Serum, Qualitative:   Specimen: Blood       Result                      Value             Ref Range           HCG Qualitative             Negative          Negative       -Troponin:   Specimen: Blood        Result                      Value             Ref Range           Troponin T                  <0.010            0.000 - 0.03*  -aPTT:   Specimen: Blood       Result                      Value             Ref Range           PTT                         26.2              24.0 - 31.0 *  -Protime-INR:   Specimen: Blood       Result                      Value             Ref Range           Protime                     11.4              9.6 - 11.7 S*       INR                         1.03              0.93 - 1.10    -Comprehensive Metabolic Panel:   Specimen: Blood       Result                      Value             Ref Range           Glucose                     107 (H)           65 - 99 mg/dL       BUN                         12                6 - 20 mg/dL        Creatinine                  0.89              0.57 - 1.00 *       Sodium                      137               136 - 145 mm*       Potassium                   4.0               3.5 - 5.2 mm*       Chloride                    103               98 - 107 mmo*       CO2                         21.0 (L)          22.0 - 29.0 *       Calcium                     9.1               8.6 - 10.5 m*       Total Protein               7.6               6.0 - 8.5 g/*       Albumin                     4.30              3.50 - 5.20 *       ALT (SGPT)                  10                1 - 33 U/L          AST (SGOT)                  13                1 - 32 U/L          Alkaline Phosphatase        56                39 - 117 U/L        Total Bilirubin             0.4               0.0 - 1.2 mg*       eGFR Non  Amer       71                >60 mL/min/1*       Globulin                    3.3               gm/dL               A/G Ratio                   1.3               g/dL                BUN/Creatinine Ratio        13.5              7.0 - 25.0          Anion Gap                   13.0              5.0 - 15.0 m*  -CBC Auto Differential:   Specimen: Blood       Result                       Value             Ref Range           WBC                         10.00             3.40 - 10.80*       RBC                         4.87              3.77 - 5.28 *       Hemoglobin                  14.1              12.0 - 15.9 *       Hematocrit                  41.4              34.0 - 46.6 %       MCV                         84.9              79.0 - 97.0 *       MCH                         28.8              26.6 - 33.0 *       MCHC                        34.0              31.5 - 35.7 *       RDW                         13.2              12.3 - 15.4 %       RDW-SD                      38.9              37.0 - 54.0 *       MPV                         7.5               6.0 - 12.0 fL       Platelets                   338               140 - 450 10*       Neutrophil %                54.6              42.7 - 76.0 %       Lymphocyte %                35.3              19.6 - 45.3 %       Monocyte %                  6.9               5.0 - 12.0 %        Eosinophil %                2.4               0.3 - 6.2 %         Basophil %                  0.8               0.0 - 1.5 %         Neutrophils, Absolute       5.50              1.70 - 7.00 *       Lymphocytes, Absolute       3.50 (H)          0.70 - 3.10 *       Monocytes, Absolute         0.70              0.10 - 0.90 *       Eosinophils, Absolute       0.20              0.00 - 0.40 *       Basophils, Absolute         0.10              0.00 - 0.20 *       nRBC                        0.0               0.0 - 0.2 /1*  -ECG 12 Lead:        Result                      Value             Ref Range           QT Interval                 349               ms             CT Chest Pulmonary Embolism   Final Result        No CTA evidence of pulmonary embolism or acute aortic pathology.        Patchy and bandlike opacities in the left lower lobe may be due to atelectasis, though superimposed infection is not excluded.        Bilateral breast implants with possible  rupture of the right implant medially.        Electronically signed by:  Og Morales M.D.      12/14/2021 11:59 PM     XR Chest 1 View   Final Result    Impression:        Band of opacity in the lateral left lung base, which could be due to atelectasis or scarring.        No additional acute cardiopulmonary process seen.        Electronically signed by:  Og Morales M.D.      12/14/2021 11:46 PM       Pain currently resolved, pain has waxed and waned in the ED however.  Atypical presentation without clear etiology.  Breast implant volume appears similar to the left implant without any evidence of gross rupture.  I did recommend patient follow-up with her surgeon after she is discharged and hopefully ruled out for cardiac etiology of pain.       Amount and/or Complexity of Data Reviewed  Clinical lab tests: ordered and reviewed  Tests in the radiology section of CPT®: ordered and reviewed  Tests in the medicine section of CPT®: reviewed and ordered        Final diagnoses:   Chest pain, unspecified type       ED Disposition  ED Disposition     ED Disposition Condition Comment    Decision to Admit            No follow-up provider specified.       Medication List      No changes were made to your prescriptions during this visit.          Bertrand Szymanski MD  12/15/21 0405

## 2021-12-15 NOTE — PLAN OF CARE
Problem: Adult Inpatient Plan of Care  Goal: Plan of Care Review  Outcome: Ongoing, Not Progressing  Flowsheets (Taken 12/15/2021 0535)  Progress: no change  Plan of Care Reviewed With:   patient   spouse  Outcome Summary: new admit for chest pain, NPO for possible cardiac testing, VSS, SR on the monitor, pain is better after PRN meds, will monitor  Goal: Patient-Specific Goal (Individualized)  Outcome: Ongoing, Not Progressing  Goal: Absence of Hospital-Acquired Illness or Injury  Outcome: Ongoing, Not Progressing  Goal: Optimal Comfort and Wellbeing  Outcome: Ongoing, Not Progressing  Goal: Readiness for Transition of Care  Outcome: Ongoing, Not Progressing  Intervention: Mutually Develop Transition Plan  Recent Flowsheet Documentation  Taken 12/15/2021 0533 by Roma Suarez, RN  Transportation Anticipated: family or friend will provide  Patient/Family Anticipated Services at Transition: none  Patient/Family Anticipates Transition to: home with family  Taken 12/15/2021 0531 by Roma Suarez, RN  Equipment Currently Used at Home: none     Problem: Chest Pain  Goal: Resolution of Chest Pain Symptoms  Outcome: Ongoing, Not Progressing   Goal Outcome Evaluation:  Plan of Care Reviewed With: patient, spouse        Progress: no change  Outcome Summary: new admit for chest pain, NPO for possible cardiac testing, VSS, SR on the monitor, pain is better after PRN meds, will monitor

## 2021-12-15 NOTE — DISCHARGE SUMMARY
[facility]    Provider, No Known    CHIEF COMPLAINT:     Chest pain    HISTORY OF PRESENT ILLNESS:    Obtained from admitting provider HPI on 12/15/2021:  36-year-old female with onset of moderate substernal chest pain she describes as a spasm sensation at 11:30 PM.  Patient denies any associated symptoms.  Patient did have jaw surgery done to realign her jaw last Thursday.  No history of DVT or PE, calf pain or swelling.  No cardiac history.  Patient has had hyperlipidemia in the past.  Patient quit smoking 1 year ago.  No history of diabetes or hypertension or family history of premature CAD.  No clear aggravating alleviating factors regarding chest pain.    12/15/2021 (post observation admission): Patient Kevin HPI noted above including chest pain which began at approximately 2330 on 2021 and happened shortly after she had given her friend hog.  She reports the pain feels like a squeezing/spasm and tightness which initially caused her to drop to her knees located substernally with some diaphoresis occurring at the time of the event. Patient does report some reproducibility of pain with movement such as when she moves her right arm above her head. She denies any dyspnea, cough or fever, nausea or vomiting or syncope.  She does confirm that she recently had surgery on her jaw to fix some malalignment and has tolerated the procedure as well as recovery generally well.  She is remained compliant with her outpatient antibiotics as well as analgesics.  No significant family history of cardiovascular diseases reported.        Past Medical History:   Diagnosis Date   • Anxiety    • Varicose vein of leg      Past Surgical History:   Procedure Laterality Date   • BREAST AUGMENTATION     •  SECTION      x 2   • TONSILLECTOMY AND ADENOIDECTOMY     • UMBILICAL HERNIA REPAIR N/A 2019    Procedure: UMBILICAL HERNIA REPAIR;  Surgeon: Mendez Cavazos MD;  Location: [facility] OR;   Service: General     Family History   Problem Relation Age of Onset   • Diabetes Mother    • Arthritis Mother    • Diabetes Father    • Hypertension Father      Social History     Tobacco Use   • Smoking status: Former Smoker   • Smokeless tobacco: Never Used   Vaping Use   • Vaping Use: Never used   Substance Use Topics   • Alcohol use: Yes     Alcohol/week: 4.0 standard drinks     Types: 4 Glasses of wine per week   • Drug use: Never     Medications Prior to Admission   Medication Sig Dispense Refill Last Dose   • HYDROcodone-acetaminophen (NORCO) 5-325 MG per tablet Take 1 tablet by mouth Every 4 (Four) Hours As Needed for Moderate Pain  for up to 10 doses. (Patient not taking: Reported on 12/15/2021) 10 tablet 0 Not Taking at Unknown time     Allergies:  Patient has no known allergies.    Immunization History   Administered Date(s) Administered   • Flu Vaccine Intradermal Quad 18-64YR 10/04/2019   • Hepatitis A 05/03/2018   • Influenza TIV (IM) 11/02/2017           REVIEW OF SYSTEMS:    Review of Systems   Constitutional: Positive for diaphoresis.   HENT: Negative.    Eyes: Negative.    Cardiovascular: Positive for chest pain and palpitations. Negative for dyspnea on exertion, irregular heartbeat, leg swelling, near-syncope, orthopnea and syncope.   Respiratory: Negative.    Endocrine: Negative.    Skin: Negative.    Musculoskeletal: Negative.    Gastrointestinal: Negative.    Genitourinary: Negative.    Neurological: Negative.    Psychiatric/Behavioral: Negative.        Vital Signs  Temp:  [98.1 °F (36.7 °C)-98.4 °F (36.9 °C)] 98.2 °F (36.8 °C)  Heart Rate:  [68-84] 75  Resp:  [16-18] 16  BP: (105-128)/(72-82) 108/74          Physical Exam:  Physical Exam  Vitals reviewed. Exam conducted with a chaperone present.   Constitutional:       General: She is not in acute distress.     Appearance: Normal appearance. She is normal weight. She is not ill-appearing, toxic-appearing or diaphoretic.   HENT:      Head:  Normocephalic and atraumatic.      Right Ear: External ear normal.      Left Ear: External ear normal.      Nose: Nose normal.      Mouth/Throat:      Mouth: Mucous membranes are moist.   Eyes:      Extraocular Movements: Extraocular movements intact.   Cardiovascular:      Rate and Rhythm: Normal rate and regular rhythm.      Pulses: Normal pulses.      Heart sounds: Normal heart sounds.   Pulmonary:      Effort: Pulmonary effort is normal.      Breath sounds: Normal breath sounds.   Abdominal:      General: Bowel sounds are normal. There is no distension.      Palpations: Abdomen is soft.      Tenderness: There is no abdominal tenderness.   Musculoskeletal:         General: Normal range of motion.      Cervical back: Normal range of motion.      Comments: Breast symmetry continues to appear generally normal with no obvious bruising, erythema or edema noted    Skin:     General: Skin is warm and dry.      Capillary Refill: Capillary refill takes less than 2 seconds.   Neurological:      General: No focal deficit present.      Mental Status: She is alert and oriented to person, place, and time.   Psychiatric:         Mood and Affect: Mood normal.         Behavior: Behavior normal.         Thought Content: Thought content normal.         Judgment: Judgment normal.         Emotional Behavior:   Normal   Debilities:  None  Results Review:    I reviewed the patient's new clinical results.  Lab Results (most recent)     Procedure Component Value Units Date/Time    Comprehensive Metabolic Panel [[id number]] Collected: 12/15/21 0611    Specimen: Blood Updated: 12/15/21 0717     Glucose 97 mg/dL      BUN 12 mg/dL      Creatinine 0.81 mg/dL      Sodium 137 mmol/L      Potassium 4.4 mmol/L      Chloride 104 mmol/L      CO2 23.0 mmol/L      Calcium 8.8 mg/dL      Total Protein 7.4 g/dL      Albumin 4.00 g/dL      ALT (SGPT) 8 U/L      AST (SGOT) 15 U/L      Alkaline Phosphatase 54 U/L      Total Bilirubin 0.4 mg/dL      eGFR  Non  Amer 80 mL/min/1.73      Globulin 3.4 gm/dL      A/G Ratio 1.2 g/dL      BUN/Creatinine Ratio 14.8     Anion Gap 10.0 mmol/L     Narrative:      GFR Normal >60  Chronic Kidney Disease <60  Kidney Failure <15      Troponin [[id number]]  (Normal) Collected: 12/15/21 0611    Specimen: Blood Updated: 12/15/21 0717     Troponin T <0.010 ng/mL     Narrative:      Troponin T Reference Range:  <= 0.03 ng/mL-   Negative for AMI  >0.03 ng/mL-     Abnormal for myocardial necrosis.  Clinicians would have to utilize clinical acumen, EKG, Troponin and serial changes to determine if it is an Acute Myocardial Infarction or myocardial injury due to an underlying chronic condition.       Results may be falsely decreased if patient taking Biotin.      Extra Tubes [[id number]] Collected: 12/15/21 0611    Specimen: Blood, Venous Line Updated: 12/15/21 0716    Narrative:      The following orders were created for panel order Extra Tubes.  Procedure                               Abnormality         Status                     ---------                               -----------         ------                     Lavender Top[[id number]]                                     Final result                 Please view results for these tests on the individual orders.    Lavender Top [[id number]] Collected: 12/15/21 0611    Specimen: Blood Updated: 12/15/21 0716     Extra Tube hold for add-on     Comment: Auto resulted       COVID-19,CEPHEID/ADAM,COR/RENE/PAD/ARINA IN-HOUSE(OR EMERGENT/ADD-ON),NP SWAB IN TRANSPORT MEDIA 3-4 HR TAT, RT-PCR - Swab, Nasopharynx [[id number]]  (Normal) Collected: 12/15/21 0044    Specimen: Swab from Nasopharynx Updated: 12/15/21 0108     COVID19 Not Detected    Narrative:      Fact sheet for providers: https://www.fda.gov/media/325890/download     Fact sheet for patients: https://www.fda.gov/media/718853/download  Fact sheet for providers: https://www.fda.gov/media/574616/download    Fact sheet for patients:  https://www.fda.gov/media/105147/download    Test performed by PCR.    Comprehensive Metabolic Panel [[id number]]  (Abnormal) Collected: 12/15/21 0035    Specimen: Blood Updated: 12/15/21 0107     Glucose 107 mg/dL      BUN 12 mg/dL      Creatinine 0.89 mg/dL      Sodium 137 mmol/L      Potassium 4.0 mmol/L      Chloride 103 mmol/L      CO2 21.0 mmol/L      Calcium 9.1 mg/dL      Total Protein 7.6 g/dL      Albumin 4.30 g/dL      ALT (SGPT) 10 U/L      AST (SGOT) 13 U/L      Alkaline Phosphatase 56 U/L      Total Bilirubin 0.4 mg/dL      eGFR Non African Amer 71 mL/min/1.73      Globulin 3.3 gm/dL      A/G Ratio 1.3 g/dL      BUN/Creatinine Ratio 13.5     Anion Gap 13.0 mmol/L     Narrative:      GFR Normal >60  Chronic Kidney Disease <60  Kidney Failure <15      Magnesium [[id number]]  (Normal) Collected: 12/15/21 0035    Specimen: Blood Updated: 12/15/21 0107     Magnesium 2.0 mg/dL     Troponin [[id number]]  (Normal) Collected: 12/15/21 0035    Specimen: Blood Updated: 12/15/21 0107     Troponin T <0.010 ng/mL     Narrative:      Troponin T Reference Range:  <= 0.03 ng/mL-   Negative for AMI  >0.03 ng/mL-     Abnormal for myocardial necrosis.  Clinicians would have to utilize clinical acumen, EKG, Troponin and serial changes to determine if it is an Acute Myocardial Infarction or myocardial injury due to an underlying chronic condition.       Results may be falsely decreased if patient taking Biotin.      D-dimer, Quantitative [[id number]]  (Abnormal) Collected: 12/15/21 0035    Specimen: Blood Updated: 12/15/21 0059     D-Dimer, Quantitative 1.63 mg/L (FEU)     Narrative:      Reference Range  --------------------------------------------------------------------     < 0.50   Negative Predictive Value  0.50-0.59   Indeterminate    >= 0.60   Probable VTE             A very low percentage of patients with DVT may yield D-Dimer results   below the cut-off of 0.50 mg/L FEU.  This is known to be more   prevalent in  patients with distal DVT.             Results of this test should always be interpreted in conjunction with   the patient's medical history, clinical presentation and other   findings.  Clinical diagnosis should not be based on the result of   IN[place]OV[place] D-Dimer alone.    aPTT [[id number]]  (Normal) Collected: 12/15/21 0035    Specimen: Blood Updated: 12/15/21 0059     PTT 26.2 seconds     Protime-INR [[id number]]  (Normal) Collected: 12/15/21 0035    Specimen: Blood Updated: 12/15/21 0059     Protime 11.4 Seconds      INR 1.03    hCG, Serum, Qualitative [[id number]]  (Normal) Collected: 12/15/21 0035    Specimen: Blood Updated: 12/15/21 0054     HCG Qualitative Negative    CBC & Differential [[id number]]  (Abnormal) Collected: 12/15/21 0035    Specimen: Blood Updated: 12/15/21 0040    Narrative:      The following orders were created for panel order CBC & Differential.  Procedure                               Abnormality         Status                     ---------                               -----------         ------                     CBC Auto Differential[[id number]]        Abnormal            Final result                 Please view results for these tests on the individual orders.    CBC Auto Differential [[id number]]  (Abnormal) Collected: 12/15/21 0035    Specimen: Blood Updated: 12/15/21 0040     WBC 10.00 10*3/mm3      RBC 4.87 10*6/mm3      Hemoglobin 14.1 g/dL      Hematocrit 41.4 %      MCV 84.9 fL      MCH 28.8 pg      MCHC 34.0 g/dL      RDW 13.2 %      RDW-SD 38.9 fl      MPV 7.5 fL      Platelets 338 10*3/mm3      Neutrophil % 54.6 %      Lymphocyte % 35.3 %      Monocyte % 6.9 %      Eosinophil % 2.4 %      Basophil % 0.8 %      Neutrophils, Absolute 5.50 10*3/mm3      Lymphocytes, Absolute 3.50 10*3/mm3      Monocytes, Absolute 0.70 10*3/mm3      Eosinophils, Absolute 0.20 10*3/mm3      Basophils, Absolute 0.10 10*3/mm3      nRBC 0.0 /100 WBC           Imaging Results (Most Recent)     Procedure  Component Value Units Date/Time    XR Chest 1 View [[id number]] Collected: 12/15/21 0145     Updated: 12/15/21 0202    Narrative:      Frontal chest radiograph    Indication:  Chest pain    Comparison:  None    Findings:    There is a band of opacity in the lateral left lung base. No focal consolidation, pneumothorax, or pleural fluid collection seen. The cardiomediastinal silhouette is within normal limits given radiographic technique.  No acute focal bony abnormality   seen.      Impression:      Impression:    Band of opacity in the lateral left lung base, which could be due to atelectasis or scarring.    No additional acute cardiopulmonary process seen.    Electronically signed by:  Og Morales M.D.    12/14/2021 11:46 PM    CT Chest Pulmonary Embolism [[id number]] Collected: 12/15/21 0156     Updated: 12/15/21 0200    Narrative:      EXAMINATION: CT ANGIOGRAM CHEST WITH IV CONTRAST       INDICATION: Elevated d-dimer, chest pain    COMPARISON: Radiograph from today    PROCEDURE: Multiple axial CT images were obtained of the chest after IV administration of contrast.  Coronal and sagittal reformations as well as MIP images were obtained. CT dose lowering techniques were used, to include: automated exposure control,   adjustment for patient size, and or use of iterative reconstruction.    FINDINGS:    Cardiovascular: No filling defects are seen in the pulmonary arteries. No focal aortic aneurysm or evidence of aortic dissection is seen.    Mediastinum/Estelle: No significant mediastinal or hilar adenopathy is seen.    Lungs/Pleura :  No pneumothorax or pleural fluid collection. There are patchy and bandlike opacities in the left lower lobe.    Chest wall and Axilla: There are bilateral breast implants. The right implant may be ruptured along its medial aspect.    Bones:  No acute focal bony abnormality seen.    Upper abdomen: No focal abnormalities seen.      Impression:        No CTA evidence of pulmonary  embolism or acute aortic pathology.    Patchy and bandlike opacities in the left lower lobe may be due to atelectasis, though superimposed infection is not excluded.    Bilateral breast implants with possible rupture of the right implant medially.    Electronically signed by:  Og Morales M.D.    12/14/2021 11:59 PM        reviewed    ECG/EMG Results (most recent)     Procedure Component Value Units Date/Time    ECG 12 Lead [[id number]] Collected: 12/15/21 0011     Updated: 12/15/21 0013     QT Interval 349 ms     Narrative:      HEART RATE= 90  bpm  RR Interval= 668  ms  WA Interval= 133  ms  P Horizontal Axis= -4  deg  P Front Axis= -2  deg  QRSD Interval= 82  ms  QT Interval= 349  ms  QRS Axis= 37  deg  T Wave Axis= 248  deg  - ABNORMAL ECG -  Sinus rhythm  Nonspecific T abnormalities, diffuse leads  Electronically Signed By:   Date and Time of Study: 2021-12-15 00:11:35        reviewed            Microbiology Results (last 10 days)     Procedure Component Value - Date/Time    COVID-19,CEPHEID/ADAM,COR/RENE/PAD/ARINA IN-HOUSE(OR EMERGENT/ADD-ON),NP SWAB IN TRANSPORT MEDIA 3-4 HR TAT, RT-PCR - Swab, Nasopharynx [[id number]]  (Normal) Collected: 12/15/21 0044    Lab Status: Final result Specimen: Swab from Nasopharynx Updated: 12/15/21 0108     COVID19 Not Detected    Narrative:      Fact sheet for providers: https://www.fda.gov/media/731656/download     Fact sheet for patients: https://www.fda.gov/media/572836/download  Fact sheet for providers: https://www.fda.gov/media/839766/download    Fact sheet for patients: https://www.fda.gov/media/798671/download    Test performed by PCR.          Assessment/Plan     Chest pain       Chest pain  -Serial troponin: Less than 0.010  -D-dimer: 1.63  -Chest X-Ray: Reported with band of opacity in the lateral left lung base possibly due to atelectasis or scarring  -CT PE protocol shows no evidence of pulmonary embolism or acute aortic pathology with patchy and bandlike  opacities in the left lower lobe possibly due to atelectasis superimposed infection was not excluded by radiologist.  Bilateral breast implants were reported with possible rupture of the right implant medially.  -EKG shows sinus rhythm at 90 without obvious acute ST changes or ectopy though some diffuse T wave inversion is noted with a QTC of 427 ms  -In the ED pt given 324 mg aspirin and Toradol  -Stress Test showed a calculated EF of 58% with normal myocardial perfusion imaging with no evidence of ischemia consistent with a low risk study  -Telemetry  -NPO  -Norco and Flexeril prescribed at discharge  -Follow-up with plastic surgery      I discussed the patients findings and my recommendations with patient and nursing staff.     Discharge Diagnosis:      Chest pain      Hospital Course  Patient is a 36 y.o. female presented with chest pain and HPI noted above.  Serial troponins remain less than 0.010 and D-dimer was noted is elevated at 1.63.  Chest x-ray showed bandlike opacity in the lateral left lung possibly due to atelectasis or scarring.  CT PE protocol was obtained which showed no evidence of pulmonary embolism or acute aortic pathology with a patchy bandlike opacity in the left lower lobe again noted possibly due to atelectasis though superimposed infection could not be excluded by radiologist.  Interpreting provider also noted bilateral breast implants present with possible rupture on the right implant medially.  Breast exam was performed with RN chaperone present with generally symmetric appearance noted and no obvious bruising, edema or erythema present.  She denies any tenderness to palpation.  EKG was reviewed which showed sinus rhythm at 90 without obvious acute ST changes or ectopy though some diffuse T wave inversion was present with a QTC of 427 ms.  She was given 3 and 24 mg aspirin as well as Toradol in the ED.  Telemetry was continued without significant events noted.  Stress testing was  performed on 12/15/2021 and showed a calculated EF of 58% with no evidence of ischemia on myocardial perfusion imaging consistent with a low risk study per interpreting provider.  At this time patient is felt to be in good condition for discharge with close follow-up with her PCP as well as plastic surgery on an outpatient basis.  Her testing/results and plan were discussed with patient along with concerning/alarm symptoms for which to call 911/return to the ED.  A short course of Norco and Flexeril will be prescribed at discharge.  All questions were answered and she verbalizes her understanding and agreement.    Past Medical History:     Past Medical History:   Diagnosis Date   • Anxiety    • Varicose vein of leg        Past Surgical History:     Past Surgical History:   Procedure Laterality Date   • BREAST AUGMENTATION     •  SECTION      x 2   • TONSILLECTOMY AND ADENOIDECTOMY     • UMBILICAL HERNIA REPAIR N/A 2019    Procedure: UMBILICAL HERNIA REPAIR;  Surgeon: Mendez Cavazos MD;  Location: [facility];  Service: General       Social History:   Social History     Socioeconomic History   • Marital status:    Tobacco Use   • Smoking status: Former Smoker   • Smokeless tobacco: Never Used   Vaping Use   • Vaping Use: Never used   Substance and Sexual Activity   • Alcohol use: Yes     Alcohol/week: 4.0 standard drinks     Types: 4 Glasses of wine per week   • Drug use: Never   • Sexual activity: Defer       Procedures Performed         Consults:   Consults     No orders found from 2021 to 2021.          Condition on Discharge:     Stable    Discharge Disposition  Home or Self Care    Discharge Medications     Discharge Medications      New Medications      Instructions Start Date   cyclobenzaprine 10 MG tablet  Commonly known as: FLEXERIL   10 mg, Oral, 3 Times Daily         Changes to Medications      Instructions Start Date   HYDROcodone-acetaminophen 5-325 MG per  tablet  Commonly known as: NORCO  What changed: when to take this   1 tablet, Oral, Every 6 Hours PRN             Discharge Diet:     Activity at Discharge:   Activity Instructions     Driving Restrictions      No driving while taking Norco or Flexeril    Type of Restriction: Driving    Driving Restrictions: No Driving While Taking Narcotics          Follow-up Appointments  No future appointments.  Additional Instructions for the Follow-ups that You Need to Schedule     Discharge Follow-up with PCP   As directed       Currently Documented PCP:    Provider, No Known    PCP Phone Number:    None     Follow Up Details: 5 to 7 days         Discharge Follow-up with Specified Provider: Plastic surgery   As directed      To: Plastic surgery    Follow Up Details: As scheduled               Test Results Pending at Discharge       Risk for Readmission (LACE) Score: 1 (12/15/2021  6:02 AM)          Cayden Villagran PA-C  12/15/21  11:05 EST

## 2021-12-15 NOTE — PLAN OF CARE
Goal Outcome Evaluation:  Plan of Care Reviewed With: patient        Progress: improving  Outcome Summary: Pt had a normal stress myoview today. CT scan shows possible rupture of right breast implant. Pt will see Dr. Mendoza plastic surgeon on Friday December 17th at 10:00 am. pt will be discharged home. continue to monitor

## 2021-12-15 NOTE — CASE MANAGEMENT/SOCIAL WORK
Case Management Discharge Note      Final Note: DC home prior to CM assessment.         Selected Continued Care - Discharged on 12/15/2021 Admission date: 12/15/2021 - Discharge disposition: Home or Self Care          Final Discharge Disposition Code: 01 - home or self-care

## 2021-12-19 LAB — QT INTERVAL: 349 MS

## 2023-03-21 ENCOUNTER — HOSPITAL ENCOUNTER (EMERGENCY)
Facility: HOSPITAL | Age: 38
Discharge: HOME | End: 2023-03-22
Attending: EMERGENCY MEDICINE | Admitting: EMERGENCY MEDICINE
Payer: COMMERCIAL

## 2023-03-21 ENCOUNTER — APPOINTMENT (OUTPATIENT)
Dept: GENERAL RADIOLOGY | Facility: HOSPITAL | Age: 38
End: 2023-03-21
Payer: COMMERCIAL

## 2023-03-21 DIAGNOSIS — N39.0 URINARY TRACT INFECTION WITHOUT HEMATURIA, SITE UNSPECIFIED: ICD-10-CM

## 2023-03-21 DIAGNOSIS — T40.711A UNINTENTIONAL POISONING BY CANNABIS, INITIAL ENCOUNTER: Primary | ICD-10-CM

## 2023-03-21 LAB
BASOPHILS # BLD AUTO: 0 10*3/MM3 (ref 0–0.2)
BASOPHILS NFR BLD AUTO: 0.9 % (ref 0–1.5)
DEPRECATED RDW RBC AUTO: 41.6 FL (ref 37–54)
EOSINOPHIL # BLD AUTO: 0.1 10*3/MM3 (ref 0–0.4)
EOSINOPHIL NFR BLD AUTO: 2.6 % (ref 0.3–6.2)
ERYTHROCYTE [DISTWIDTH] IN BLOOD BY AUTOMATED COUNT: 13.4 % (ref 12.3–15.4)
HCT VFR BLD AUTO: 39.2 % (ref 34–46.6)
HGB BLD-MCNC: 13.2 G/DL (ref 12–15.9)
LYMPHOCYTES # BLD AUTO: 3 10*3/MM3 (ref 0.7–3.1)
LYMPHOCYTES NFR BLD AUTO: 55.2 % (ref 19.6–45.3)
MCH RBC QN AUTO: 28.7 PG (ref 26.6–33)
MCHC RBC AUTO-ENTMCNC: 33.7 G/DL (ref 31.5–35.7)
MCV RBC AUTO: 85.3 FL (ref 79–97)
MONOCYTES # BLD AUTO: 0.8 10*3/MM3 (ref 0.1–0.9)
MONOCYTES NFR BLD AUTO: 14.7 % (ref 5–12)
NEUTROPHILS NFR BLD AUTO: 1.5 10*3/MM3 (ref 1.7–7)
NEUTROPHILS NFR BLD AUTO: 26.6 % (ref 42.7–76)
NRBC BLD AUTO-RTO: 0.1 /100 WBC (ref 0–0.2)
PLATELET # BLD AUTO: 294 10*3/MM3 (ref 140–450)
PMV BLD AUTO: 8.1 FL (ref 6–12)
RBC # BLD AUTO: 4.6 10*6/MM3 (ref 3.77–5.28)
WBC NRBC COR # BLD: 5.5 10*3/MM3 (ref 3.4–10.8)

## 2023-03-21 PROCEDURE — 84484 ASSAY OF TROPONIN QUANT: CPT | Performed by: EMERGENCY MEDICINE

## 2023-03-21 PROCEDURE — 85025 COMPLETE CBC W/AUTO DIFF WBC: CPT | Performed by: EMERGENCY MEDICINE

## 2023-03-21 PROCEDURE — 99283 EMERGENCY DEPT VISIT LOW MDM: CPT

## 2023-03-21 PROCEDURE — 85730 THROMBOPLASTIN TIME PARTIAL: CPT | Performed by: EMERGENCY MEDICINE

## 2023-03-21 PROCEDURE — 25010000002 LORAZEPAM PER 2 MG: Performed by: EMERGENCY MEDICINE

## 2023-03-21 PROCEDURE — 96374 THER/PROPH/DIAG INJ IV PUSH: CPT

## 2023-03-21 PROCEDURE — 85610 PROTHROMBIN TIME: CPT | Performed by: EMERGENCY MEDICINE

## 2023-03-21 PROCEDURE — 80179 DRUG ASSAY SALICYLATE: CPT | Performed by: EMERGENCY MEDICINE

## 2023-03-21 PROCEDURE — 80143 DRUG ASSAY ACETAMINOPHEN: CPT | Performed by: EMERGENCY MEDICINE

## 2023-03-21 PROCEDURE — 71045 X-RAY EXAM CHEST 1 VIEW: CPT

## 2023-03-21 PROCEDURE — 80053 COMPREHEN METABOLIC PANEL: CPT | Performed by: EMERGENCY MEDICINE

## 2023-03-21 PROCEDURE — 93005 ELECTROCARDIOGRAM TRACING: CPT | Performed by: EMERGENCY MEDICINE

## 2023-03-21 PROCEDURE — 82077 ASSAY SPEC XCP UR&BREATH IA: CPT | Performed by: EMERGENCY MEDICINE

## 2023-03-21 PROCEDURE — 85379 FIBRIN DEGRADATION QUANT: CPT | Performed by: EMERGENCY MEDICINE

## 2023-03-21 RX ORDER — LORAZEPAM 2 MG/ML
0.5 INJECTION INTRAMUSCULAR ONCE
Status: COMPLETED | OUTPATIENT
Start: 2023-03-21 | End: 2023-03-21

## 2023-03-21 RX ORDER — SODIUM CHLORIDE 0.9 % (FLUSH) 0.9 %
10 SYRINGE (ML) INJECTION AS NEEDED
Status: DISCONTINUED | OUTPATIENT
Start: 2023-03-21 | End: 2023-03-22 | Stop reason: HOSPADM

## 2023-03-21 RX ADMIN — LORAZEPAM 0.5 MG: 2 INJECTION INTRAMUSCULAR; INTRAVENOUS at 23:43

## 2023-03-22 VITALS
BODY MASS INDEX: 27.3 KG/M2 | HEART RATE: 74 BPM | WEIGHT: 184.3 LBS | DIASTOLIC BLOOD PRESSURE: 67 MMHG | TEMPERATURE: 98.1 F | HEIGHT: 69 IN | SYSTOLIC BLOOD PRESSURE: 101 MMHG | RESPIRATION RATE: 15 BRPM | OXYGEN SATURATION: 100 %

## 2023-03-22 LAB
ALBUMIN SERPL-MCNC: 4 G/DL (ref 3.5–5.2)
ALBUMIN/GLOB SERPL: 1.3 G/DL
ALP SERPL-CCNC: 59 U/L (ref 39–117)
ALT SERPL W P-5'-P-CCNC: 17 U/L (ref 1–33)
AMPHET+METHAMPHET UR QL: NEGATIVE
ANION GAP SERPL CALCULATED.3IONS-SCNC: 12 MMOL/L (ref 5–15)
APAP SERPL-MCNC: <5 MCG/ML (ref 0–30)
APTT PPP: 24.9 SECONDS (ref 61–76.5)
AST SERPL-CCNC: 21 U/L (ref 1–32)
BACTERIA UR QL AUTO: ABNORMAL /HPF
BARBITURATES UR QL SCN: NEGATIVE
BENZODIAZ UR QL SCN: NEGATIVE
BILIRUB SERPL-MCNC: 0.2 MG/DL (ref 0–1.2)
BILIRUB UR QL STRIP: NEGATIVE
BUN SERPL-MCNC: 13 MG/DL (ref 6–20)
BUN/CREAT SERPL: 15.1 (ref 7–25)
CALCIUM SPEC-SCNC: 8.6 MG/DL (ref 8.6–10.5)
CANNABINOIDS SERPL QL: POSITIVE
CHLORIDE SERPL-SCNC: 105 MMOL/L (ref 98–107)
CLARITY UR: CLEAR
CO2 SERPL-SCNC: 23 MMOL/L (ref 22–29)
COCAINE UR QL: NEGATIVE
COLOR UR: YELLOW
CREAT SERPL-MCNC: 0.86 MG/DL (ref 0.57–1)
D DIMER PPP FEU-MCNC: 0.2 MG/L (FEU) (ref 0–0.5)
EGFRCR SERPLBLD CKD-EPI 2021: 88.8 ML/MIN/1.73
ETHANOL UR QL: <0.01 %
GEN 5 2HR TROPONIN T REFLEX: <6 NG/L
GLOBULIN UR ELPH-MCNC: 3.1 GM/DL
GLUCOSE SERPL-MCNC: 113 MG/DL (ref 65–99)
GLUCOSE UR STRIP-MCNC: NEGATIVE MG/DL
HGB UR QL STRIP.AUTO: NEGATIVE
HOLD SPECIMEN: NORMAL
HYALINE CASTS UR QL AUTO: ABNORMAL /LPF
INR PPP: 1.03 (ref 0.93–1.1)
KETONES UR QL STRIP: ABNORMAL
LEUKOCYTE ESTERASE UR QL STRIP.AUTO: ABNORMAL
METHADONE UR QL SCN: NEGATIVE
NITRITE UR QL STRIP: NEGATIVE
OPIATES UR QL: NEGATIVE
OXYCODONE UR QL SCN: NEGATIVE
PH UR STRIP.AUTO: 7 [PH] (ref 5–8)
POTASSIUM SERPL-SCNC: 3.4 MMOL/L (ref 3.5–5.2)
PROT SERPL-MCNC: 7.1 G/DL (ref 6–8.5)
PROT UR QL STRIP: NEGATIVE
PROTHROMBIN TIME: 10.6 SECONDS (ref 9.6–11.7)
RBC # UR STRIP: ABNORMAL /HPF
REF LAB TEST METHOD: ABNORMAL
SALICYLATES SERPL-MCNC: <0.3 MG/DL
SODIUM SERPL-SCNC: 140 MMOL/L (ref 136–145)
SP GR UR STRIP: 1.02 (ref 1–1.03)
SQUAMOUS #/AREA URNS HPF: ABNORMAL /HPF
TROPONIN T DELTA: NORMAL
TROPONIN T SERPL HS-MCNC: <6 NG/L
UROBILINOGEN UR QL STRIP: ABNORMAL
WBC # UR STRIP: ABNORMAL /HPF

## 2023-03-22 PROCEDURE — 80307 DRUG TEST PRSMV CHEM ANLYZR: CPT | Performed by: EMERGENCY MEDICINE

## 2023-03-22 PROCEDURE — 81001 URINALYSIS AUTO W/SCOPE: CPT | Performed by: EMERGENCY MEDICINE

## 2023-03-22 PROCEDURE — 84484 ASSAY OF TROPONIN QUANT: CPT | Performed by: EMERGENCY MEDICINE

## 2023-03-22 RX ORDER — CEFDINIR 300 MG/1
300 CAPSULE ORAL ONCE
Status: COMPLETED | OUTPATIENT
Start: 2023-03-22 | End: 2023-03-22

## 2023-03-22 RX ORDER — CEFDINIR 300 MG/1
300 CAPSULE ORAL 2 TIMES DAILY
Qty: 14 CAPSULE | Refills: 0 | Status: SHIPPED | OUTPATIENT
Start: 2023-03-22

## 2023-03-22 RX ADMIN — CEFDINIR 300 MG: 300 CAPSULE ORAL at 04:49

## 2023-03-22 NOTE — ED NOTES
PT arrived via PV to triage with  c/o ingestion of CBD gummy. Pt reported to family that it was a CBD gummy she got from a coworker but family was unable to find the packaging unsure of dosage and type. Pt is anxious and restless in triage.

## 2023-03-22 NOTE — ED PROVIDER NOTES
"Subjective   History of Present Illness  Chief complaint: Patient is a 37-year-old female.  She cannot provide very good history.  She is very anxious at this point in time and upset.  She was given a CBD gummy couple weeks ago from a friend.  She states she took it tonight to help her sleep.  She came in here moaning and tearful.  She states \"I am not me\".  She states \"everything is wrong\".  When asked to give specific symptoms she does not really have any specific abnormalities other than she just states \"my chest\".  She appears very anxious.  States she has not had marijuana for a long time.  Since she was a teenager.  She took it less than an hour ago.    Context:    Duration:    Timing:    Severity:    Associated Symptoms:        PCP:  LMP:        Review of Systems   Unable to perform ROS: Other       Past Medical History:   Diagnosis Date   • Anxiety    • Varicose vein of leg        No Known Allergies    Past Surgical History:   Procedure Laterality Date   • BREAST AUGMENTATION     •  SECTION      x 2   • TONSILLECTOMY AND ADENOIDECTOMY     • UMBILICAL HERNIA REPAIR N/A 2019    Procedure: UMBILICAL HERNIA REPAIR;  Surgeon: Mendez Cavazos MD;  Location: [facility] OR;  Service: General       Family History   Problem Relation Age of Onset   • Diabetes Mother    • Arthritis Mother    • Diabetes Father    • Hypertension Father        Social History     Socioeconomic History   • Marital status:    Tobacco Use   • Smoking status: Former   • Smokeless tobacco: Never   Vaping Use   • Vaping Use: Never used   Substance and Sexual Activity   • Alcohol use: Yes     Alcohol/week: 4.0 standard drinks     Types: 4 Glasses of wine per week   • Drug use: Never   • Sexual activity: Defer           Objective   Physical Exam  Vitals and nursing note reviewed.   Constitutional:       Comments: Patient is rocking in bed and moaning repeatedly   HENT:      Head: Normocephalic and atraumatic.   Eyes:      " Extraocular Movements: Extraocular movements intact.   Cardiovascular:      Rate and Rhythm: Normal rate and regular rhythm.      Heart sounds: Normal heart sounds.   Pulmonary:      Effort: Pulmonary effort is normal.      Breath sounds: Normal breath sounds.   Abdominal:      Tenderness: There is no abdominal tenderness.   Musculoskeletal:      Cervical back: Normal range of motion.   Skin:     General: Skin is warm and dry.   Neurological:      General: No focal deficit present.      Mental Status: She is alert and oriented to person, place, and time.   Psychiatric:         Mood and Affect: Mood is anxious.         Behavior: Behavior is hyperactive.         Procedures           ED Course  ED Course as of 03/22/23 0411   Wed Mar 22, 2023   0306 Patient still being observed,     [LH]      ED Course User Index  [LH] Christo Everett DO      Results for orders placed or performed during the hospital encounter of 03/21/23   Comprehensive Metabolic Panel    Specimen: Blood   Result Value Ref Range    Glucose 113 (H) 65 - 99 mg/dL    BUN 13 6 - 20 mg/dL    Creatinine 0.86 0.57 - 1.00 mg/dL    Sodium 140 136 - 145 mmol/L    Potassium 3.4 (L) 3.5 - 5.2 mmol/L    Chloride 105 98 - 107 mmol/L    CO2 23.0 22.0 - 29.0 mmol/L    Calcium 8.6 8.6 - 10.5 mg/dL    Total Protein 7.1 6.0 - 8.5 g/dL    Albumin 4.0 3.5 - 5.2 g/dL    ALT (SGPT) 17 1 - 33 U/L    AST (SGOT) 21 1 - 32 U/L    Alkaline Phosphatase 59 39 - 117 U/L    Total Bilirubin 0.2 0.0 - 1.2 mg/dL    Globulin 3.1 gm/dL    A/G Ratio 1.3 g/dL    BUN/Creatinine Ratio 15.1 7.0 - 25.0    Anion Gap 12.0 5.0 - 15.0 mmol/L    eGFR 88.8 >60.0 mL/min/1.73   Protime-INR    Specimen: Blood   Result Value Ref Range    Protime 10.6 9.6 - 11.7 Seconds    INR 1.03 0.93 - 1.10   aPTT    Specimen: Blood   Result Value Ref Range    PTT 24.9 (L) 61.0 - 76.5 seconds   Urinalysis With Microscopic If Indicated (No Culture) - Urine, Clean Catch    Specimen: Urine, Clean Catch   Result  Value Ref Range    Color, UA Yellow Yellow, Straw    Appearance, UA Clear Clear    pH, UA 7.0 5.0 - 8.0    Specific Gravity, UA 1.022 1.005 - 1.030    Glucose, UA Negative Negative    Ketones, UA Trace (A) Negative    Bilirubin, UA Negative Negative    Blood, UA Negative Negative    Protein, UA Negative Negative    Leuk Esterase, UA Trace (A) Negative    Nitrite, UA Negative Negative    Urobilinogen, UA 1.0 E.U./dL 0.2 - 1.0 E.U./dL   High Sensitivity Troponin T    Specimen: Blood   Result Value Ref Range    HS Troponin T <6 <10 ng/L   Acetaminophen Level    Specimen: Blood   Result Value Ref Range    Acetaminophen <5.0 0.0 - 30.0 mcg/mL   Ethanol    Specimen: Blood   Result Value Ref Range    Ethanol % <0.010 %   Salicylate Level    Specimen: Blood   Result Value Ref Range    Salicylate <0.3 <=30.0 mg/dL   Urine Drug Screen - Urine, Clean Catch    Specimen: Urine, Clean Catch   Result Value Ref Range    Amphet/Methamphet, Screen Negative Negative    Barbiturates Screen, Urine Negative Negative    Benzodiazepine Screen, Urine Negative Negative    Cocaine Screen, Urine Negative Negative    Opiate Screen Negative Negative    THC, Screen, Urine Positive (A) Negative    Methadone Screen, Urine Negative Negative    Oxycodone Screen, Urine Negative Negative   CBC Auto Differential    Specimen: Blood   Result Value Ref Range    WBC 5.50 3.40 - 10.80 10*3/mm3    RBC 4.60 3.77 - 5.28 10*6/mm3    Hemoglobin 13.2 12.0 - 15.9 g/dL    Hematocrit 39.2 34.0 - 46.6 %    MCV 85.3 79.0 - 97.0 fL    MCH 28.7 26.6 - 33.0 pg    MCHC 33.7 31.5 - 35.7 g/dL    RDW 13.4 12.3 - 15.4 %    RDW-SD 41.6 37.0 - 54.0 fl    MPV 8.1 6.0 - 12.0 fL    Platelets 294 140 - 450 10*3/mm3    Neutrophil % 26.6 (L) 42.7 - 76.0 %    Lymphocyte % 55.2 (H) 19.6 - 45.3 %    Monocyte % 14.7 (H) 5.0 - 12.0 %    Eosinophil % 2.6 0.3 - 6.2 %    Basophil % 0.9 0.0 - 1.5 %    Neutrophils, Absolute 1.50 (L) 1.70 - 7.00 10*3/mm3    Lymphocytes, Absolute 3.00 0.70 - 3.10  10*3/mm3    Monocytes, Absolute 0.80 0.10 - 0.90 10*3/mm3    Eosinophils, Absolute 0.10 0.00 - 0.40 10*3/mm3    Basophils, Absolute 0.00 0.00 - 0.20 10*3/mm3    nRBC 0.1 0.0 - 0.2 /100 WBC   High Sensitivity Troponin T 2Hr    Specimen: Blood   Result Value Ref Range    HS Troponin T <6 <10 ng/L    Troponin T Delta     D-dimer, Quantitative    Specimen: Blood   Result Value Ref Range    D-Dimer, Quantitative 0.20 0.00 - 0.50 mg/L (FEU)   Urinalysis, Microscopic Only - Urine, Clean Catch    Specimen: Urine, Clean Catch   Result Value Ref Range    RBC, UA 0-2 (A) None Seen /HPF    WBC, UA 13-20 (A) None Seen /HPF    Bacteria, UA 1+ (A) None Seen /HPF    Squamous Epithelial Cells, UA 0-2 None Seen, 0-2 /HPF    Hyaline Casts, UA 0-2 None Seen /LPF    Methodology Automated Microscopy    ECG 12 Lead Chest Pain   Result Value Ref Range    QT Interval 375 ms   Gold Top - SST   Result Value Ref Range    Extra Tube Hold for add-ons.           XR Chest 1 View    Result Date: 3/22/2023  No acute cardiopulmonary process. Electronically signed by:  Vikram Coffey D.O.  3/22/2023 12:37 AM Mountain Time                                    Medical Decision Making  Patient was seen and evaluated after she began acting bizarre early after gummy ingestion    Differential diagnosis includes but is not limited to marijuana overdose, polysubstance overdose, ACS    Patient was seen and evaluated.  Very anxious on original evaluation.  She received some Ativan here and eventually did calm down and feels at this point time just sleepy.  She did not take medication to harm herself.  She took a friend's marijuana gummy that she was given a week or so ago.  She had not done any marijuana since she was a teenager.  She does feel much improved.  Her EKG shows persisting T wave inversions that were present 2021 when she had a negative stress test.  I do not think she has ACS.  Her D-dimer is normal.  Chest x-ray is clear.  No signs of pneumothorax.   I do not think she has pulmonary embolism.  She states she was just trying to relax and go to sleep.  At this point time she and  feel comfortable with discharge    Unintentional poisoning by cannabis, initial encounter: acute illness or injury  Urinary tract infection without hematuria, site unspecified: acute illness or injury  Amount and/or Complexity of Data Reviewed  External Data Reviewed: notes.     Details: Stress test 12/15/2021 reviewed.  There were persistent T wave inversions through this test.  She was low risk.      Labs: ordered. Decision-making details documented in ED Course.  Radiology: ordered and independent interpretation performed.     Details: Chest x-ray shows no acute process  ECG/medicine tests: ordered and independent interpretation performed.     Details: EKG reviewed interpreted by myself shows sinus rhythm rate of 82.  She has diffuse global T wave inversions that are present on prior EKG that was reviewed noted 12/15/2021      Risk  Prescription drug management.  Decision regarding hospitalization.          Final diagnoses:   None   Accidental THC overdose ingestion    ED Disposition  ED Disposition     None          No follow-up provider specified.       Medication List      No changes were made to your prescriptions during this visit.          Christo Everett, DO  03/22/23 0445       Christo Everett,   03/22/23 0616

## 2023-03-24 LAB — QT INTERVAL: 375 MS

## 2023-12-18 ENCOUNTER — OFFICE VISIT (OUTPATIENT)
Dept: OBSTETRICS AND GYNECOLOGY | Age: 38
End: 2023-12-18
Payer: COMMERCIAL

## 2023-12-18 VITALS
WEIGHT: 169 LBS | BODY MASS INDEX: 25.03 KG/M2 | HEIGHT: 69 IN | DIASTOLIC BLOOD PRESSURE: 72 MMHG | SYSTOLIC BLOOD PRESSURE: 126 MMHG

## 2023-12-18 DIAGNOSIS — Z11.51 SCREENING FOR HUMAN PAPILLOMAVIRUS (HPV): ICD-10-CM

## 2023-12-18 DIAGNOSIS — Z13.89 SCREENING FOR BLOOD OR PROTEIN IN URINE: ICD-10-CM

## 2023-12-18 DIAGNOSIS — Z12.4 SCREENING FOR MALIGNANT NEOPLASM OF CERVIX: ICD-10-CM

## 2023-12-18 DIAGNOSIS — Z01.419 WELL FEMALE EXAM WITH ROUTINE GYNECOLOGICAL EXAM: Primary | ICD-10-CM

## 2023-12-18 PROBLEM — R07.9 CHEST PAIN: Status: RESOLVED | Noted: 2021-12-15 | Resolved: 2023-12-18

## 2023-12-18 PROBLEM — K42.9 UMBILICAL HERNIA WITHOUT OBSTRUCTION AND WITHOUT GANGRENE: Status: RESOLVED | Noted: 2019-10-29 | Resolved: 2023-12-18

## 2023-12-18 PROBLEM — Z00.00 ANNUAL PHYSICAL EXAM: Status: RESOLVED | Noted: 2019-10-28 | Resolved: 2023-12-18

## 2023-12-18 LAB
BILIRUB BLD-MCNC: ABNORMAL MG/DL
CLARITY, POC: CLEAR
COLOR UR: YELLOW
GLUCOSE UR STRIP-MCNC: NEGATIVE MG/DL
KETONES UR QL: ABNORMAL
LEUKOCYTE EST, POC: NEGATIVE
NITRITE UR-MCNC: NEGATIVE MG/ML
PH UR: 5.5 [PH] (ref 5–8)
PROT UR STRIP-MCNC: ABNORMAL MG/DL
RBC # UR STRIP: NEGATIVE /UL
SP GR UR: 1.03 (ref 1–1.03)
UROBILINOGEN UR QL: ABNORMAL

## 2023-12-18 RX ORDER — PROPRANOLOL HYDROCHLORIDE 10 MG/1
10 TABLET ORAL 2 TIMES DAILY
COMMUNITY

## 2023-12-18 NOTE — PROGRESS NOTES
[facility]   Obstetrics and Gynecology     2023    Patient: Goldie Weber          MR#:[id number]    History of Present Illness    Chief Complaint   Patient presents with    Gynecologic Exam     CC: New Annual, last pap        38 y.o. female  who presents for annual exam.    Patient presents for new patient annual exam visit feeling well without complaints.  She reports regular menses with manageable menstrual cycle.  The patient does incidentally report light menses today        Relevant data reviewed:    Patient's last menstrual period was 12/15/2023.  Obstetric History:  OB History          5    Para   2    Term   2            AB   3    Living   2         SAB   3    IAB        Ectopic        Molar        Multiple        Live Births   2               Menstrual History:     Patient's last menstrual period was 12/15/2023.       Social History     Substance and Sexual Activity   Sexual Activity Yes    Partners: Male    Birth control/protection: Tubal ligation     ______________________________________  Patient Active Problem List   Diagnosis   (none) - all problems resolved or deleted     Past Medical History:   Diagnosis Date    Anxiety     Varicose vein of leg      Past Surgical History:   Procedure Laterality Date    BREAST AUGMENTATION Bilateral 2012     SECTION      x 2    TONSILLECTOMY AND ADENOIDECTOMY  2009    TUBAL ABDOMINAL LIGATION      UMBILICAL HERNIA REPAIR N/A 2019    Procedure: UMBILICAL HERNIA REPAIR;  Surgeon: Mendez Cavazos MD;  Location: [facility] OR;  Service: General     Social History     Tobacco Use   Smoking Status Some Days    Packs/day: 0.00    Years: 15.00    Additional pack years: 0.00    Total pack years: 0.00    Types: Cigarettes    Passive exposure: Past   Smokeless Tobacco Never   Tobacco Comments    When i drink I may vape     Family History   Problem Relation Age of Onset    Diabetes Father     Hypertension  "Father     Diabetes Mother     Arthritis Mother     Diabetes Paternal Grandmother     Diabetes Paternal Aunt     Diabetes Paternal Uncle     Breast cancer Neg Hx     Ovarian cancer Neg Hx     Uterine cancer Neg Hx     Colon cancer Neg Hx      Prior to Admission medications    Medication Sig Start Date End Date Taking? Authorizing Provider   propranolol (INDERAL) 10 MG tablet Take 1 tablet by mouth 2 (Two) Times a Day.   Yes Provider, MD Olvin   cefdinir (OMNICEF) 300 MG capsule Take 1 capsule by mouth 2 (Two) Times a Day. 3/22/23   Christo Everett, DO     _______________________________________    Current contraception: tubal ligation  History of abnormal Pap smear: no  Family history of uterine or ovarian cancer: no  Family History of colon cancer/colon polyps: no  History of abnormal mammogram: no  History of abnormal lipids: no    The following portions of the patient's history were reviewed and updated as appropriate: allergies, current medications, past family history, past medical history, past social history, past surgical history, and problem list.    Review of Systems    Pertinent items are noted in HPI.       Objective   Physical Exam    /72   Ht 175.3 cm (69\")   Wt 76.7 kg (169 lb)   LMP 12/15/2023   BMI 24.96 kg/m²    BP Readings from Last 3 Encounters:   12/18/23 126/72   03/22/23 101/67   12/15/21 108/74      Wt Readings from Last 3 Encounters:   12/18/23 76.7 kg (169 lb)   03/21/23 83.6 kg (184 lb 4.9 oz)   12/15/21 77.7 kg (171 lb 4.8 oz)        BMI: Estimated body mass index is 24.96 kg/m² as calculated from the following:    Height as of this encounter: 175.3 cm (69\").    Weight as of this encounter: 76.7 kg (169 lb).       General: alert, appears stated age, and cooperative   Heart: regular rate and rhythm, S1, S2 normal, no murmur, click, rub or gallop   Lungs: clear to auscultation bilaterally   Abdomen: soft, non-tender, without masses, no organomegaly   Breast: inspection " negative, no nipple discharge or bleeding, no masses or nodularity palpable   External genitalia/Vulva: External genitalia including bartholin's glands, Urethra, [place]'s gland and urethra meatus are normal, Perineum, rectum and anus appear normal , and Bladder appears normal without significant prolapse    Vagina: normal mucosa, normal discharge   Cervix: no lesions and light menses note    Uterus: normal size and non-tender   Adnexa: normal adnexa     As part of wellness and prevention, the following topics were discussed with the patient:  Encouraged self breast exam  Physical activity and regular exercised encouraged.         Problem List   Meds  History  Prep for Surg   Imagin}    Assessment:  Diagnoses and all orders for this visit:    1. Well female exam with routine gynecological exam (Primary)  -     IGP, Apt HPV,rfx 16 / 18,45    2. Screening for human papillomavirus (HPV)  -     IGP, Apt HPV,rfx 16 / 18,45    3. Screening for malignant neoplasm of cervix  -     IGP, Apt HPV,rfx 16 / 18,45    4. Screening for blood or protein in urine  -     POC Urinalysis Dipstick      Plan:  Return in 1 year (on 2024) for Annual exam.    Umer Cavazos MD  2023 11:33 EST

## 2023-12-19 ENCOUNTER — PATIENT ROUNDING (BHMG ONLY) (OUTPATIENT)
Dept: OBSTETRICS AND GYNECOLOGY | Age: 38
End: 2023-12-19
Payer: COMMERCIAL

## 2023-12-19 NOTE — PROGRESS NOTES
A MY CHART MESSAGE HAS BEEN SENT TO THE PATIENT FOR [facility] ROUNDING.       Patient Weight (Optional But Required For Cumulative Dose-Numbers And Decimals Only): 125

## 2023-12-26 LAB
CYTOLOGIST CVX/VAG CYTO: NORMAL
CYTOLOGY CVX/VAG DOC CYTO: NORMAL
CYTOLOGY CVX/VAG DOC THIN PREP: NORMAL
DX ICD CODE: NORMAL
HIV 1 & 2 AB SER-IMP: NORMAL
HPV I/H RISK 4 DNA CVX QL PROBE+SIG AMP: NEGATIVE
Lab: NORMAL
OTHER STN SPEC: NORMAL
RECOM F/U CVX/VAG CYTO: NORMAL
STAT OF ADQ CVX/VAG CYTO-IMP: NORMAL

## 2024-11-20 NOTE — ANESTHESIA PREPROCEDURE EVALUATION
Anesthesia Evaluation     Patient summary reviewed and Nursing notes reviewed   NPO Solid Status: > 8 hours  NPO Liquid Status: > 8 hours           Airway   Mallampati: II  TM distance: >3 FB  Neck ROM: full  No difficulty expected  Dental - normal exam     Pulmonary - negative pulmonary ROS and normal exam   Cardiovascular - negative cardio ROS and normal exam        Neuro/Psych- negative ROS  GI/Hepatic/Renal/Endo    (+) obesity,       Musculoskeletal (-) negative ROS    Abdominal  - normal exam    Bowel sounds: normal.   Substance History - negative use     OB/GYN negative ob/gyn ROS         Other                        Anesthesia Plan    ASA 2     general     intravenous induction     Anesthetic plan, all risks, benefits, and alternatives have been provided, discussed and informed consent has been obtained with: patient.       Attempted to contact patient with lab results. Unable to reach patient or spouse. Left message

## 2025-03-31 ENCOUNTER — OFFICE VISIT (OUTPATIENT)
Dept: OBSTETRICS AND GYNECOLOGY | Age: 40
End: 2025-03-31
Payer: COMMERCIAL

## 2025-03-31 VITALS
HEIGHT: 67 IN | WEIGHT: 163 LBS | DIASTOLIC BLOOD PRESSURE: 72 MMHG | SYSTOLIC BLOOD PRESSURE: 122 MMHG | BODY MASS INDEX: 25.58 KG/M2

## 2025-03-31 DIAGNOSIS — Z12.31 SCREENING MAMMOGRAM FOR BREAST CANCER: ICD-10-CM

## 2025-03-31 DIAGNOSIS — Z01.419 WELL FEMALE EXAM WITH ROUTINE GYNECOLOGICAL EXAM: Primary | ICD-10-CM

## 2025-03-31 DIAGNOSIS — Z13.89 SCREENING FOR BLOOD OR PROTEIN IN URINE: ICD-10-CM

## 2025-03-31 DIAGNOSIS — Z11.51 SCREENING FOR HUMAN PAPILLOMAVIRUS (HPV): ICD-10-CM

## 2025-03-31 DIAGNOSIS — Z12.4 SCREENING FOR MALIGNANT NEOPLASM OF CERVIX: ICD-10-CM

## 2025-03-31 LAB
BILIRUB BLD-MCNC: NEGATIVE MG/DL
CLARITY, POC: CLEAR
COLOR UR: YELLOW
GLUCOSE UR STRIP-MCNC: NEGATIVE MG/DL
KETONES UR QL: NEGATIVE
LEUKOCYTE EST, POC: NEGATIVE
NITRITE UR-MCNC: NEGATIVE MG/ML
PH UR: 5.5 [PH] (ref 5–8)
PROT UR STRIP-MCNC: NEGATIVE MG/DL
RBC # UR STRIP: ABNORMAL /UL
SP GR UR: 1.03 (ref 1–1.03)
UROBILINOGEN UR QL: NORMAL

## 2025-03-31 PROCEDURE — 81002 URINALYSIS NONAUTO W/O SCOPE: CPT | Performed by: OBSTETRICS & GYNECOLOGY

## 2025-03-31 PROCEDURE — 99396 PREV VISIT EST AGE 40-64: CPT | Performed by: OBSTETRICS & GYNECOLOGY

## 2025-03-31 NOTE — PROGRESS NOTES
[facility]   Obstetrics and Gynecology     3/31/2025    Patient: Goldie Weber          MR#:[id number]    History of Present Illness    Chief Complaint   Patient presents with    Gynecologic Exam     Annual, last pap 23 not preformed, HPV neg,   C/O pain in right lower lymph-node, missed a period        39 y.o. female  who presents for annual exam.    The patient presents for her regular exam without significant complaints.  She does notice some periodic inguinal lymph node swelling the last for about a week.        Relevant data reviewed:    Patient's last menstrual period was 2025.  Obstetric History:  OB History          5    Para   2    Term   2            AB   3    Living   2         SAB   3    IAB        Ectopic        Molar        Multiple        Live Births   2               Menstrual History:     Patient's last menstrual period was 2025.       Social History     Substance and Sexual Activity   Sexual Activity Yes    Partners: Male    Birth control/protection: Tubal ligation     ______________________________________  Patient Active Problem List   Diagnosis   (none) - all problems resolved or deleted     Past Medical History:   Diagnosis Date    Anxiety     Herpes     Recurrent pregnancy loss, antepartum condition or complication 2006    Varicella Early     Varicose vein of leg      Past Surgical History:   Procedure Laterality Date    BREAST AUGMENTATION Bilateral 2012     SECTION      x 2     SECTION WITH TUBAL  2014    D & C WITH SUCTION  2006    TONSILLECTOMY AND ADENOIDECTOMY  2009    TUBAL ABDOMINAL LIGATION      UMBILICAL HERNIA REPAIR N/A 2019    Procedure: UMBILICAL HERNIA REPAIR;  Surgeon: Mendez Cavazos MD;  Location: [facility] MAIN OR;  Service: General    WISDOM TOOTH EXTRACTION  2006     Social History     Tobacco Use   Smoking Status Some Days    Current packs/day: 0.00    Types: Cigarettes    Passive  "exposure: Past   Smokeless Tobacco Never   Tobacco Comments    When i drink I may vape     Family History   Problem Relation Age of Onset    Diabetes Father     Hypertension Father     Diabetes Mother     Arthritis Mother     Diabetes Paternal Grandmother     Diabetes Paternal Aunt     Diabetes Paternal Uncle     Breast cancer Neg Hx     Ovarian cancer Neg Hx     Uterine cancer Neg Hx     Colon cancer Neg Hx      Prior to Admission medications    Medication Sig Start Date End Date Taking? Authorizing Provider   SEMAGLUTIDE, 1 MG/DOSE, SC  3/29/25  Yes ProviderOlvin MD   fluticasone (FLONASE) 50 MCG/ACT nasal spray 2 sprays into the nostril(s) as directed by provider Daily for 30 days.  Patient not taking: Reported on 3/31/2025 1/8/24 2/7/24  Otf Paez APRN   propranolol (INDERAL) 10 MG tablet Take 1 tablet by mouth 2 (Two) Times a Day.  Patient not taking: Reported on 3/31/2025    Provider, MD Olvin     _______________________________________    Current contraception: tubal ligation  History of abnormal Pap smear: no  Family history of uterine or ovarian cancer: no  Family History of colon cancer/colon polyps: no  History of abnormal mammogram: no  History of abnormal lipids: no    The following portions of the patient's history were reviewed and updated as appropriate: allergies, current medications, past family history, past medical history, past social history, past surgical history, and problem list.    Review of Systems    Pertinent items are noted in HPI.       Objective   Physical Exam    /72   Ht 170.2 cm (67\")   Wt 73.9 kg (163 lb)   LMP 03/07/2025   BMI 25.53 kg/m²    BP Readings from Last 3 Encounters:   03/31/25 122/72   01/08/24 125/83   12/18/23 126/72      Wt Readings from Last 3 Encounters:   03/31/25 73.9 kg (163 lb)   01/08/24 74.8 kg (165 lb)   12/18/23 76.7 kg (169 lb)        BMI: Estimated body mass index is 25.53 kg/m² as calculated from the following:    " "Height as of this encounter: 170.2 cm (67\").    Weight as of this encounter: 73.9 kg (163 lb).       General: alert, appears stated age, and cooperative   Heart: regular rate and rhythm, S1, S2 normal, no murmur, click, rub or gallop   Lungs: clear to auscultation bilaterally   Abdomen: soft, non-tender, without masses, no organomegaly   Breast: inspection negative, no nipple discharge or bleeding, no masses or nodularity palpable and bilateral breast implants   External genitalia/Vulva: External genitalia including bartholin's glands, Urethra, [employer]'s gland and urethra meatus are normal, Perineum, rectum and anus appear normal , and Bladder appears normal without significant prolapse    Vagina: normal mucosa, normal discharge   Cervix: no lesions   Uterus: normal size and non-tender   Adnexa: normal adnexa     As part of wellness and prevention, the following topics were discussed with the patient:  Encouraged self breast exam  Physical activity and regular exercised encouraged.         Problem List   Meds  History  Prep for Surg   Imagin}    Assessment:     Well female exam with routine gynecological exam    Orders:    IGP, Apt HPV,rfx 16 / 18,45    Screening for blood or protein in urine    Orders:    POC Urinalysis Dipstick    Screening for human papillomavirus (HPV)    Orders:    IGP, Apt HPV,rfx 16 / 18,45    Screening for malignant neoplasm of cervix    Orders:    IGP, Apt HPV,rfx 16 / 18,45    Screening mammogram for breast cancer    Orders:    Mammo Screening Digital Tomosynthesis Bilateral With CAD; Future    Plan:  Return in 1 year (on 3/31/2026) for Annual exam.    Umer Cavazos MD  3/31/2025 08:37 EDT  "

## 2025-04-03 LAB
CYTOLOGIST CVX/VAG CYTO: NORMAL
CYTOLOGY CVX/VAG DOC CYTO: NORMAL
CYTOLOGY CVX/VAG DOC THIN PREP: NORMAL
DX ICD CODE: NORMAL
HPV I/H RISK 4 DNA CVX QL PROBE+SIG AMP: NEGATIVE
OTHER STN SPEC: NORMAL
SERVICE CMNT-IMP: NORMAL
STAT OF ADQ CVX/VAG CYTO-IMP: NORMAL

## 2025-04-10 LAB
NCCN CRITERIA FLAG: NORMAL
TYRER CUZICK SCORE: 11.1

## 2025-04-11 ENCOUNTER — HOSPITAL ENCOUNTER (OUTPATIENT)
Dept: MAMMOGRAPHY | Facility: HOSPITAL | Age: 40
Discharge: HOME | End: 2025-04-11
Admitting: OBSTETRICS & GYNECOLOGY
Payer: COMMERCIAL

## 2025-04-11 DIAGNOSIS — Z12.31 SCREENING MAMMOGRAM FOR BREAST CANCER: ICD-10-CM

## 2025-04-11 PROCEDURE — 77067 SCR MAMMO BI INCL CAD: CPT

## 2025-04-11 PROCEDURE — 77063 BREAST TOMOSYNTHESIS BI: CPT

## (undated) DEVICE — GLV SURG BIOGEL LTX PF 7

## (undated) DEVICE — VIOLET BRAIDED (POLYGLACTIN 910), SYNTHETIC ABSORBABLE SUTURE: Brand: COATED VICRYL

## (undated) DEVICE — UNDERGLV SURG BIOGEL INDICATOR LTX PF 7

## (undated) DEVICE — PK PROC TURNOVER

## (undated) DEVICE — SKIN AFFIX SURG ADHESIVE 72/CS 0.55ML: Brand: MEDLINE

## (undated) DEVICE — UNDYED BRAIDED (POLYGLACTIN 910), SYNTHETIC ABSORBABLE SUTURE: Brand: COATED VICRYL

## (undated) DEVICE — DRSNG SURESITE WNDW 4X4.5

## (undated) DEVICE — SPNG GZ 2S 2X2 8PLY STRL PK/2

## (undated) DEVICE — PK MAJ LAPAROTOMY 50

## (undated) DEVICE — 3M™ PATIENT PLATE, CORDED, SPLIT, LARGE, 40 PER CASE, 1179: Brand: 3M™

## (undated) DEVICE — SOL IRRIG H2O 1000ML STRL

## (undated) DEVICE — 3M™ IOBAN™ 2 ANTIMICROBIAL INCISE DRAPE 6650EZ: Brand: IOBAN™ 2

## (undated) DEVICE — SUT VIC 3/0 SH 27IN J416H